# Patient Record
Sex: FEMALE | Race: WHITE | Employment: FULL TIME | URBAN - METROPOLITAN AREA
[De-identification: names, ages, dates, MRNs, and addresses within clinical notes are randomized per-mention and may not be internally consistent; named-entity substitution may affect disease eponyms.]

---

## 2017-01-25 ENCOUNTER — HOSPITAL ENCOUNTER (EMERGENCY)
Age: 37
Discharge: HOME | End: 2017-01-25
Attending: EMERGENCY MEDICINE
Payer: COMMERCIAL

## 2017-01-25 VITALS
BODY MASS INDEX: 24.66 KG/M2 | SYSTOLIC BLOOD PRESSURE: 125 MMHG | OXYGEN SATURATION: 99 % | WEIGHT: 134 LBS | HEART RATE: 98 BPM | HEIGHT: 62 IN | TEMPERATURE: 98 F | DIASTOLIC BLOOD PRESSURE: 75 MMHG | RESPIRATION RATE: 18 BRPM

## 2017-01-25 DIAGNOSIS — R19.7 DIARRHEA, UNSPECIFIED TYPE: Primary | ICD-10-CM

## 2017-01-25 DIAGNOSIS — R11.0 NAUSEA: ICD-10-CM

## 2017-01-25 LAB
ALBUMIN SERPL-MCNC: 4.2 G/DL (ref 3.5–4.8)
ALP LIVER SERPL-CCNC: 84 U/L (ref 37–98)
ALT SERPL-CCNC: 23 U/L (ref 14–54)
AST SERPL-CCNC: 17 U/L (ref 15–41)
BASOPHILS # BLD AUTO: 0.01 X10(3) UL (ref 0–0.1)
BASOPHILS NFR BLD AUTO: 0.1 %
BILIRUB SERPL-MCNC: 0.5 MG/DL (ref 0.1–2)
BUN BLD-MCNC: 11 MG/DL (ref 8–20)
CALCIUM BLD-MCNC: 9.2 MG/DL (ref 8.3–10.3)
CHLORIDE: 107 MMOL/L (ref 101–111)
CO2: 23 MMOL/L (ref 22–32)
CREAT BLD-MCNC: 0.7 MG/DL (ref 0.55–1.02)
EOSINOPHIL # BLD AUTO: 0.03 X10(3) UL (ref 0–0.3)
EOSINOPHIL NFR BLD AUTO: 0.3 %
ERYTHROCYTE [DISTWIDTH] IN BLOOD BY AUTOMATED COUNT: 12.4 % (ref 11.5–16)
GLUCOSE BLD-MCNC: 107 MG/DL (ref 70–99)
HCT VFR BLD AUTO: 49.6 % (ref 34–50)
HGB BLD-MCNC: 16.6 G/DL (ref 12–16)
IMMATURE GRANULOCYTE COUNT: 0.02 X10(3) UL (ref 0–1)
IMMATURE GRANULOCYTE RATIO %: 0.2 %
LYMPHOCYTES # BLD AUTO: 0.45 X10(3) UL (ref 0.9–4)
LYMPHOCYTES NFR BLD AUTO: 5.1 %
M PROTEIN MFR SERPL ELPH: 8 G/DL (ref 6.1–8.3)
MCH RBC QN AUTO: 29.9 PG (ref 27–33.2)
MCHC RBC AUTO-ENTMCNC: 33.5 G/DL (ref 31–37)
MCV RBC AUTO: 89.4 FL (ref 81–100)
MONOCYTES # BLD AUTO: 0.56 X10(3) UL (ref 0.1–0.6)
MONOCYTES NFR BLD AUTO: 6.3 %
NEUTROPHIL ABS PRELIM: 7.84 X10 (3) UL (ref 1.3–6.7)
NEUTROPHILS # BLD AUTO: 7.84 X10(3) UL (ref 1.3–6.7)
NEUTROPHILS NFR BLD AUTO: 88 %
PLATELET # BLD AUTO: 256 10(3)UL (ref 150–450)
POTASSIUM SERPL-SCNC: 3.5 MMOL/L (ref 3.6–5.1)
RBC # BLD AUTO: 5.55 X10(6)UL (ref 3.8–5.1)
RED CELL DISTRIBUTION WIDTH-SD: 41.1 FL (ref 35.1–46.3)
SODIUM SERPL-SCNC: 139 MMOL/L (ref 136–144)
WBC # BLD AUTO: 8.9 X10(3) UL (ref 4–13)

## 2017-01-25 PROCEDURE — 96360 HYDRATION IV INFUSION INIT: CPT

## 2017-01-25 PROCEDURE — 99284 EMERGENCY DEPT VISIT MOD MDM: CPT

## 2017-01-25 PROCEDURE — 80053 COMPREHEN METABOLIC PANEL: CPT | Performed by: EMERGENCY MEDICINE

## 2017-01-25 PROCEDURE — 85025 COMPLETE CBC W/AUTO DIFF WBC: CPT | Performed by: EMERGENCY MEDICINE

## 2017-01-25 RX ORDER — ONDANSETRON 4 MG/1
4 TABLET, ORALLY DISINTEGRATING ORAL EVERY 4 HOURS PRN
Qty: 10 TABLET | Refills: 0 | Status: SHIPPED | OUTPATIENT
Start: 2017-01-25 | End: 2017-02-01

## 2017-01-25 NOTE — ED PROVIDER NOTES
Patient Seen in: THE [facility] Emergency Department In [place]    History   Patient presents with:  Nausea/Vomiting/Diarrhea (gastrointestinal)    Stated Complaint: DIARRHEA AND WEAKNESS SINCE 0300    HPI    39-year-old female presents here to the emergency intact. No scleral icterus or conjunctival pallor: Neck is supple without tenderness on palpation. Head is atraumatic normocephalic. Oral mucosa moist.  Tongue is midline. No posterior pharyngeal lesions. Lungs: Clear to auscultation bilaterally.   Christophe Banner that she likely has a viral form of diarrhea causing symptoms. Continue to push fluids at home. She will written Zofran for nausea.   Return if she develops any worsening symptoms such as intractable vomiting, intractable pain, blood in her stools or alte

## 2017-03-28 ENCOUNTER — OFFICE VISIT (OUTPATIENT)
Dept: FAMILY MEDICINE CLINIC | Facility: CLINIC | Age: 37
End: 2017-03-28

## 2017-03-28 ENCOUNTER — LAB ENCOUNTER (OUTPATIENT)
Dept: LAB | Age: 37
End: 2017-03-28
Attending: INTERNAL MEDICINE
Payer: COMMERCIAL

## 2017-03-28 VITALS
SYSTOLIC BLOOD PRESSURE: 122 MMHG | TEMPERATURE: 98 F | DIASTOLIC BLOOD PRESSURE: 80 MMHG | RESPIRATION RATE: 16 BRPM | WEIGHT: 138 LBS | BODY MASS INDEX: 25.4 KG/M2 | HEART RATE: 60 BPM | HEIGHT: 62 IN

## 2017-03-28 DIAGNOSIS — R53.83 FATIGUE, UNSPECIFIED TYPE: ICD-10-CM

## 2017-03-28 DIAGNOSIS — L65.9 HAIR LOSS: Primary | ICD-10-CM

## 2017-03-28 DIAGNOSIS — L65.9 HAIR LOSS: ICD-10-CM

## 2017-03-28 LAB
BASOPHILS # BLD AUTO: 0.04 X10(3) UL (ref 0–0.1)
BASOPHILS NFR BLD AUTO: 0.6 %
DEPRECATED HBV CORE AB SER IA-ACNC: 18.3 NG/ML (ref 10–291)
EOSINOPHIL # BLD AUTO: 0.07 X10(3) UL (ref 0–0.3)
EOSINOPHIL NFR BLD AUTO: 1 %
ERYTHROCYTE [DISTWIDTH] IN BLOOD BY AUTOMATED COUNT: 12.6 % (ref 11.5–16)
HCT VFR BLD AUTO: 46.4 % (ref 34–50)
HGB BLD-MCNC: 15.1 G/DL (ref 12–16)
IMMATURE GRANULOCYTE COUNT: 0.02 X10(3) UL (ref 0–1)
IMMATURE GRANULOCYTE RATIO %: 0.3 %
IRON SATURATION: 25 % (ref 13–45)
IRON: 104 UG/DL (ref 28–170)
LYMPHOCYTES # BLD AUTO: 2.37 X10(3) UL (ref 0.9–4)
LYMPHOCYTES NFR BLD AUTO: 33.7 %
MCH RBC QN AUTO: 30.6 PG (ref 27–33.2)
MCHC RBC AUTO-ENTMCNC: 32.5 G/DL (ref 31–37)
MCV RBC AUTO: 94.1 FL (ref 81–100)
MONOCYTES # BLD AUTO: 0.45 X10(3) UL (ref 0.1–0.6)
MONOCYTES NFR BLD AUTO: 6.4 %
NEUTROPHIL ABS PRELIM: 4.08 X10 (3) UL (ref 1.3–6.7)
NEUTROPHILS # BLD AUTO: 4.08 X10(3) UL (ref 1.3–6.7)
NEUTROPHILS NFR BLD AUTO: 58 %
PLATELET # BLD AUTO: 273 10(3)UL (ref 150–450)
RBC # BLD AUTO: 4.93 X10(6)UL (ref 3.8–5.1)
RED CELL DISTRIBUTION WIDTH-SD: 43.3 FL (ref 35.1–46.3)
TOTAL IRON BINDING CAPACITY: 419 UG/DL (ref 298–536)
TRANSFERRIN: 281 MG/DL (ref 200–360)
TSI SER-ACNC: 2.85 MIU/ML (ref 0.35–5.5)
WBC # BLD AUTO: 7 X10(3) UL (ref 4–13)

## 2017-03-28 PROCEDURE — 82728 ASSAY OF FERRITIN: CPT

## 2017-03-28 PROCEDURE — 84443 ASSAY THYROID STIM HORMONE: CPT

## 2017-03-28 PROCEDURE — 83540 ASSAY OF IRON: CPT

## 2017-03-28 PROCEDURE — 99203 OFFICE O/P NEW LOW 30 MIN: CPT | Performed by: INTERNAL MEDICINE

## 2017-03-28 PROCEDURE — 85025 COMPLETE CBC W/AUTO DIFF WBC: CPT

## 2017-03-28 PROCEDURE — 36415 COLL VENOUS BLD VENIPUNCTURE: CPT

## 2017-03-28 PROCEDURE — 83550 IRON BINDING TEST: CPT

## 2017-03-28 RX ORDER — AMOXICILLIN 250 MG
1 CAPSULE ORAL DAILY
COMMUNITY
End: 2020-07-06

## 2017-03-28 NOTE — PROGRESS NOTES
[facility] Internal Medicine Office Note  Chief Complaint:   Patient presents with:  Establish Care  Hair/Scalp Problem: hair loss x 1 mth       HPI:   This is a 43year old female new patient coming in for hair loss.  She stopped breastfeeding 2 Estimated body mass index is 25.23 kg/(m^2) as calculated from the following:    Height as of this encounter: 62\". Weight as of this encounter: 138 lb. Vital signs reviewed. Appears stated age, well groomed. Physical Exam   Vitals reviewed.    Salvadori 09/01/2016  Patient/Caregiver Education: Patient/Caregiver Education: There are no barriers to learning. Medical education done. Outcome: Patient verbalizes understanding.  Patient is notified to call with any questions, complications, allergies, or worseni

## 2017-03-28 NOTE — PATIENT INSTRUCTIONS
Thank you for choosing Tracy Christianson MD at [place] [address]  To Do: Julio Marie  1.  Check labs today    Follow up with dermatology if hair loss persists     • Please signup for MY CHART, which is electronic access to your record if you have no Scheduling at [phone]  Please allow our office 5 business days to contact you regarding any testing results.     Refill policies:   Allow 3 business days for refills; controlled substances may take longer and must be picked up from the office in person

## 2017-07-27 ENCOUNTER — OFFICE VISIT (OUTPATIENT)
Dept: FAMILY MEDICINE CLINIC | Facility: CLINIC | Age: 37
End: 2017-07-27

## 2017-07-27 ENCOUNTER — APPOINTMENT (OUTPATIENT)
Dept: LAB | Age: 37
End: 2017-07-27
Attending: INTERNAL MEDICINE
Payer: COMMERCIAL

## 2017-07-27 VITALS
HEART RATE: 70 BPM | HEIGHT: 62 IN | RESPIRATION RATE: 16 BRPM | TEMPERATURE: 98 F | BODY MASS INDEX: 26.13 KG/M2 | WEIGHT: 142 LBS | SYSTOLIC BLOOD PRESSURE: 120 MMHG | DIASTOLIC BLOOD PRESSURE: 80 MMHG

## 2017-07-27 DIAGNOSIS — Z00.00 WELLNESS EXAMINATION: Primary | ICD-10-CM

## 2017-07-27 DIAGNOSIS — M25.60 MORNING JOINT STIFFNESS: ICD-10-CM

## 2017-07-27 DIAGNOSIS — M25.50 MULTIPLE JOINT PAIN: ICD-10-CM

## 2017-07-27 DIAGNOSIS — Z00.00 LABORATORY EXAM ORDERED AS PART OF ROUTINE GENERAL MEDICAL EXAMINATION: ICD-10-CM

## 2017-07-27 LAB
ALBUMIN SERPL-MCNC: 4.1 G/DL (ref 3.5–4.8)
ALP LIVER SERPL-CCNC: 61 U/L (ref 37–98)
ALT SERPL-CCNC: 26 U/L (ref 14–54)
AST SERPL-CCNC: 17 U/L (ref 15–41)
BILIRUB SERPL-MCNC: 0.8 MG/DL (ref 0.1–2)
BUN BLD-MCNC: 14 MG/DL (ref 8–20)
CALCIUM BLD-MCNC: 8.6 MG/DL (ref 8.3–10.3)
CHLORIDE: 107 MMOL/L (ref 101–111)
CHOLEST SMN-MCNC: 187 MG/DL (ref ?–200)
CO2: 25 MMOL/L (ref 22–32)
CREAT BLD-MCNC: 0.7 MG/DL (ref 0.55–1.02)
GLUCOSE BLD-MCNC: 80 MG/DL (ref 70–99)
HDLC SERPL-MCNC: 90 MG/DL (ref 45–?)
HDLC SERPL: 2.08 {RATIO} (ref ?–4.44)
LDLC SERPL CALC-MCNC: 78 MG/DL (ref ?–130)
LDLC SERPL-MCNC: 19 MG/DL (ref 5–40)
M PROTEIN MFR SERPL ELPH: 8 G/DL (ref 6.1–8.3)
NONHDLC SERPL-MCNC: 97 MG/DL (ref ?–130)
POTASSIUM SERPL-SCNC: 4 MMOL/L (ref 3.6–5.1)
RHEUMATOID FACT SERPL-ACNC: <10 IU/ML (ref ?–15)
SODIUM SERPL-SCNC: 141 MMOL/L (ref 136–144)
TRIGLYCERIDES: 93 MG/DL (ref ?–150)

## 2017-07-27 PROCEDURE — 36415 COLL VENOUS BLD VENIPUNCTURE: CPT | Performed by: INTERNAL MEDICINE

## 2017-07-27 PROCEDURE — 99395 PREV VISIT EST AGE 18-39: CPT | Performed by: INTERNAL MEDICINE

## 2017-07-27 PROCEDURE — 86431 RHEUMATOID FACTOR QUANT: CPT | Performed by: INTERNAL MEDICINE

## 2017-07-27 PROCEDURE — 86200 CCP ANTIBODY: CPT | Performed by: INTERNAL MEDICINE

## 2017-07-27 PROCEDURE — 80053 COMPREHEN METABOLIC PANEL: CPT | Performed by: INTERNAL MEDICINE

## 2017-07-27 PROCEDURE — 80061 LIPID PANEL: CPT | Performed by: INTERNAL MEDICINE

## 2017-07-27 NOTE — PATIENT INSTRUCTIONS
Thank you for choosing Kevin Sarah MD at [place] [address]  To Do: Juanpablo Guevara  1. Blood work today     Effective 6/19/17 until November 2017  Due to [employer] is being moved.   It is inside the [place] [id number] on duty and for your safety to discuss with the pharmacist and our office with questions, and to notify us and stop treatment if problems arise, but know that our intention is that the benefits outweigh those potential risks and we strive to make you healthie

## 2017-07-27 NOTE — PROGRESS NOTES
[facility] Internal Medicine Office Note  Chief Complaint:   Patient presents with:  Wellness Visit      HPI:   This is a 43year old female coming in for physical  HPI  Her hair loss has significantly improved.   She followed up with several derm Eyes: Negative for visual disturbance. Respiratory: Negative for shortness of breath. Cardiovascular: Negative for chest pain. Gastrointestinal: Negative for diarrhea and constipation. Genitourinary: Negative for dysuria.    Musculoskeletal: Posi today for wellness visit.     Diagnoses and all orders for this visit:    Wellness examination - up to date with Pap smear  -Tdap within last year  -TSH and CBC were normal a few months ago; will check lipid panel and CMP    Laboratory exam ordered as part

## 2017-07-29 LAB — CYCLIC CITRULLINATED PEPTIDE: 4 UNITS

## 2018-02-03 ENCOUNTER — HOSPITAL ENCOUNTER (EMERGENCY)
Facility: HOSPITAL | Age: 38
Discharge: HOME | End: 2018-02-03
Attending: EMERGENCY MEDICINE
Payer: COMMERCIAL

## 2018-02-03 VITALS
TEMPERATURE: 99 F | DIASTOLIC BLOOD PRESSURE: 69 MMHG | BODY MASS INDEX: 29.81 KG/M2 | OXYGEN SATURATION: 97 % | RESPIRATION RATE: 18 BRPM | WEIGHT: 162 LBS | HEIGHT: 62 IN | SYSTOLIC BLOOD PRESSURE: 114 MMHG | HEART RATE: 110 BPM

## 2018-02-03 DIAGNOSIS — J11.1 INFLUENZA: ICD-10-CM

## 2018-02-03 DIAGNOSIS — Z3A.28 28 WEEKS GESTATION OF PREGNANCY: ICD-10-CM

## 2018-02-03 DIAGNOSIS — J06.9 VIRAL URI: Primary | ICD-10-CM

## 2018-02-03 DIAGNOSIS — J45.21 MILD INTERMITTENT ASTHMA WITH EXACERBATION: ICD-10-CM

## 2018-02-03 LAB
ALBUMIN SERPL-MCNC: 2.6 G/DL (ref 3.5–4.8)
ALP LIVER SERPL-CCNC: 84 U/L (ref 37–98)
ALT SERPL-CCNC: 17 U/L (ref 14–54)
AST SERPL-CCNC: 16 U/L (ref 15–41)
BASOPHILS # BLD AUTO: 0.01 X10(3) UL (ref 0–0.1)
BASOPHILS NFR BLD AUTO: 0.1 %
BILIRUB SERPL-MCNC: 0.2 MG/DL (ref 0.1–2)
BUN BLD-MCNC: 6 MG/DL (ref 8–20)
CALCIUM BLD-MCNC: 8 MG/DL (ref 8.3–10.3)
CHLORIDE: 111 MMOL/L (ref 101–111)
CO2: 21 MMOL/L (ref 22–32)
CREAT BLD-MCNC: 0.57 MG/DL (ref 0.55–1.02)
EOSINOPHIL # BLD AUTO: 0.02 X10(3) UL (ref 0–0.3)
EOSINOPHIL NFR BLD AUTO: 0.2 %
ERYTHROCYTE [DISTWIDTH] IN BLOOD BY AUTOMATED COUNT: 13.5 % (ref 11.5–16)
GLUCOSE BLD-MCNC: 119 MG/DL (ref 70–99)
HCT VFR BLD AUTO: 33.6 % (ref 34–50)
HGB BLD-MCNC: 11.2 G/DL (ref 12–16)
IMMATURE GRANULOCYTE COUNT: 0.07 X10(3) UL (ref 0–1)
IMMATURE GRANULOCYTE RATIO %: 0.7 %
LYMPHOCYTES # BLD AUTO: 0.75 X10(3) UL (ref 0.9–4)
LYMPHOCYTES NFR BLD AUTO: 7 %
M PROTEIN MFR SERPL ELPH: 6.2 G/DL (ref 6.1–8.3)
MCH RBC QN AUTO: 30.4 PG (ref 27–33.2)
MCHC RBC AUTO-ENTMCNC: 33.3 G/DL (ref 31–37)
MCV RBC AUTO: 91.3 FL (ref 81–100)
MONOCYTES # BLD AUTO: 0.75 X10(3) UL (ref 0.1–0.6)
MONOCYTES NFR BLD AUTO: 7 %
NEUTROPHIL ABS PRELIM: 9.15 X10 (3) UL (ref 1.3–6.7)
NEUTROPHILS # BLD AUTO: 9.15 X10(3) UL (ref 1.3–6.7)
NEUTROPHILS NFR BLD AUTO: 85 %
PLATELET # BLD AUTO: 158 10(3)UL (ref 150–450)
POTASSIUM SERPL-SCNC: 3.1 MMOL/L (ref 3.6–5.1)
RBC # BLD AUTO: 3.68 X10(6)UL (ref 3.8–5.1)
RED CELL DISTRIBUTION WIDTH-SD: 45.1 FL (ref 35.1–46.3)
SODIUM SERPL-SCNC: 141 MMOL/L (ref 136–144)
WBC # BLD AUTO: 10.8 X10(3) UL (ref 4–13)

## 2018-02-03 PROCEDURE — 96374 THER/PROPH/DIAG INJ IV PUSH: CPT

## 2018-02-03 PROCEDURE — 96361 HYDRATE IV INFUSION ADD-ON: CPT

## 2018-02-03 PROCEDURE — 99284 EMERGENCY DEPT VISIT MOD MDM: CPT

## 2018-02-03 PROCEDURE — 85025 COMPLETE CBC W/AUTO DIFF WBC: CPT | Performed by: EMERGENCY MEDICINE

## 2018-02-03 PROCEDURE — 87999 UNLISTED MICROBIOLOGY PX: CPT

## 2018-02-03 PROCEDURE — 87798 DETECT AGENT NOS DNA AMP: CPT | Performed by: EMERGENCY MEDICINE

## 2018-02-03 PROCEDURE — 94644 CONT INHLJ TX 1ST HOUR: CPT

## 2018-02-03 PROCEDURE — 87502 INFLUENZA DNA AMP PROBE: CPT | Performed by: EMERGENCY MEDICINE

## 2018-02-03 PROCEDURE — 80053 COMPREHEN METABOLIC PANEL: CPT | Performed by: EMERGENCY MEDICINE

## 2018-02-03 RX ORDER — OSELTAMIVIR PHOSPHATE 75 MG/1
75 CAPSULE ORAL 2 TIMES DAILY
Qty: 10 CAPSULE | Refills: 0 | Status: SHIPPED | OUTPATIENT
Start: 2018-02-03 | End: 2018-08-14 | Stop reason: ALTCHOICE

## 2018-02-03 RX ORDER — METHYLPREDNISOLONE SODIUM SUCCINATE 125 MG/2ML
125 INJECTION, POWDER, LYOPHILIZED, FOR SOLUTION INTRAMUSCULAR; INTRAVENOUS ONCE
Status: COMPLETED | OUTPATIENT
Start: 2018-02-03 | End: 2018-02-03

## 2018-02-03 RX ORDER — POTASSIUM CHLORIDE 20 MEQ/1
40 TABLET, EXTENDED RELEASE ORAL ONCE
Status: COMPLETED | OUTPATIENT
Start: 2018-02-03 | End: 2018-02-03

## 2018-02-03 NOTE — ED NOTES
DC instructions and RX handed to pt. No distress noted. Spouse at bedside. Pt thanks staff for care.  Denies need for [facility] out of ED

## 2018-02-03 NOTE — PROGRESS NOTES
Patient is a 42year old  re[place] prenatal [place] Aud[place] [facility] [place] Patient reported EDC of 18, 30w6d today. Reports + fetal movement, denies any leaking of fluid, bleeding, or cramping. Abdomen soft and non-tender.

## 2018-02-03 NOTE — ED INITIAL ASSESSMENT (HPI)
Pt to ED with c/o asthma exacerbation. Pt is 7 mos pregnant. Denies any issues with pregnancy. Pt taking nebs at home, not helping.

## 2018-02-03 NOTE — PROGRESS NOTES
Dr. Seay Led in department. MD visualized fetal heart tracing, agrees it is appropriate for gestational age.

## 2018-02-03 NOTE — ED NOTES
Round on pt. Spouse at bedside. No distress noted.  Pt denies any needs at this time  Pt sts she feels the same, no improvement to breathing

## 2018-02-03 NOTE — ED PROVIDER NOTES
Patient Seen in: [facility] Emergency Department    History   Patient presents with:  Dyspnea GUSTABO SOB (respiratory)    Stated Complaint: sob    HPI    Patient is a very pleasant 38-year-old female with a history of asthma who presents with an asthma Nontender. Extremities: No clubbing/cyanosis/edema. Skin: No rashes, no pallor  Neuro: Awake oriented ×3.   Nonfocal.  Good strength throughout    ED Course     Labs Reviewed   COMP METABOLIC PANEL (14) - Abnormal; Notable for the following:        Result monitored by labor and delivery and was cleared. Patient's feeling better. She was hydrated. Heart rate improved after off the neb. Was under 100 on monitor prior to discharge. Influenza test came back positive for influenza A. Will start Tamiflu.   C

## 2018-02-03 NOTE — ED NOTES
RT at bedside. Per MD, he spoke to [facility] MD. Our OB team will come down to ER to monitor pt.

## 2018-08-14 ENCOUNTER — OFFICE VISIT (OUTPATIENT)
Dept: INTERNAL MEDICINE CLINIC | Facility: CLINIC | Age: 38
End: 2018-08-14
Payer: COMMERCIAL

## 2018-08-14 ENCOUNTER — LAB ENCOUNTER (OUTPATIENT)
Dept: LAB | Age: 38
End: 2018-08-14
Attending: INTERNAL MEDICINE
Payer: COMMERCIAL

## 2018-08-14 VITALS
WEIGHT: 147.5 LBS | DIASTOLIC BLOOD PRESSURE: 74 MMHG | HEART RATE: 60 BPM | TEMPERATURE: 98 F | HEIGHT: 61.5 IN | BODY MASS INDEX: 27.49 KG/M2 | RESPIRATION RATE: 16 BRPM | SYSTOLIC BLOOD PRESSURE: 116 MMHG

## 2018-08-14 DIAGNOSIS — Z12.39 SCREENING FOR BREAST CANCER: ICD-10-CM

## 2018-08-14 DIAGNOSIS — Z00.00 LABORATORY EXAM ORDERED AS PART OF ROUTINE GENERAL MEDICAL EXAMINATION: ICD-10-CM

## 2018-08-14 DIAGNOSIS — F41.9 ANXIETY: ICD-10-CM

## 2018-08-14 DIAGNOSIS — L65.9 HAIR LOSS: ICD-10-CM

## 2018-08-14 DIAGNOSIS — Z00.00 WELLNESS EXAMINATION: Primary | ICD-10-CM

## 2018-08-14 LAB
ALBUMIN SERPL-MCNC: 4 G/DL (ref 3.5–4.8)
ALBUMIN/GLOB SERPL: 1.1 {RATIO} (ref 1–2)
ALP LIVER SERPL-CCNC: 69 U/L (ref 37–98)
ALT SERPL-CCNC: 28 U/L (ref 14–54)
ANION GAP SERPL CALC-SCNC: 8 MMOL/L (ref 0–18)
AST SERPL-CCNC: 20 U/L (ref 15–41)
BASOPHILS # BLD AUTO: 0.05 X10(3) UL (ref 0–0.1)
BASOPHILS NFR BLD AUTO: 0.8 %
BILIRUB SERPL-MCNC: 0.4 MG/DL (ref 0.1–2)
BUN BLD-MCNC: 15 MG/DL (ref 8–20)
BUN/CREAT SERPL: 20 (ref 10–20)
CALCIUM BLD-MCNC: 8.8 MG/DL (ref 8.3–10.3)
CHLORIDE SERPL-SCNC: 108 MMOL/L (ref 101–111)
CHOLEST SMN-MCNC: 179 MG/DL (ref ?–200)
CO2 SERPL-SCNC: 26 MMOL/L (ref 22–32)
CREAT BLD-MCNC: 0.75 MG/DL (ref 0.55–1.02)
EOSINOPHIL # BLD AUTO: 0.11 X10(3) UL (ref 0–0.3)
EOSINOPHIL NFR BLD AUTO: 1.8 %
ERYTHROCYTE [DISTWIDTH] IN BLOOD BY AUTOMATED COUNT: 13.3 % (ref 11.5–16)
GLOBULIN PLAS-MCNC: 3.5 G/DL (ref 2.5–3.7)
GLUCOSE BLD-MCNC: 89 MG/DL (ref 70–99)
HCT VFR BLD AUTO: 47 % (ref 34–50)
HDLC SERPL-MCNC: 81 MG/DL (ref 40–59)
HGB BLD-MCNC: 15.3 G/DL (ref 12–16)
IMMATURE GRANULOCYTE COUNT: 0 X10(3) UL (ref 0–1)
IMMATURE GRANULOCYTE RATIO %: 0 %
LDLC SERPL CALC-MCNC: 86 MG/DL (ref ?–100)
LYMPHOCYTES # BLD AUTO: 2.05 X10(3) UL (ref 0.9–4)
LYMPHOCYTES NFR BLD AUTO: 33.1 %
M PROTEIN MFR SERPL ELPH: 7.5 G/DL (ref 6.1–8.3)
MCH RBC QN AUTO: 30.3 PG (ref 27–33.2)
MCHC RBC AUTO-ENTMCNC: 32.6 G/DL (ref 31–37)
MCV RBC AUTO: 93.1 FL (ref 81–100)
MONOCYTES # BLD AUTO: 0.49 X10(3) UL (ref 0.1–1)
MONOCYTES NFR BLD AUTO: 7.9 %
NEUTROPHIL ABS PRELIM: 3.5 X10 (3) UL (ref 1.3–6.7)
NEUTROPHILS # BLD AUTO: 3.5 X10(3) UL (ref 1.3–6.7)
NEUTROPHILS NFR BLD AUTO: 56.4 %
NONHDLC SERPL-MCNC: 98 MG/DL (ref ?–130)
OSMOLALITY SERPL CALC.SUM OF ELEC: 294 MOSM/KG (ref 275–295)
PLATELET # BLD AUTO: 257 10(3)UL (ref 150–450)
POTASSIUM SERPL-SCNC: 4.2 MMOL/L (ref 3.6–5.1)
RBC # BLD AUTO: 5.05 X10(6)UL (ref 3.8–5.1)
RED CELL DISTRIBUTION WIDTH-SD: 45.5 FL (ref 35.1–46.3)
SODIUM SERPL-SCNC: 142 MMOL/L (ref 136–144)
TRIGL SERPL-MCNC: 60 MG/DL (ref 30–149)
TSI SER-ACNC: 2.18 MIU/ML (ref 0.35–5.5)
VIT D+METAB SERPL-MCNC: 31.1 NG/ML (ref 30–100)
VLDLC SERPL CALC-MCNC: 12 MG/DL (ref 0–30)
WBC # BLD AUTO: 6.2 X10(3) UL (ref 4–13)

## 2018-08-14 PROCEDURE — 36415 COLL VENOUS BLD VENIPUNCTURE: CPT | Performed by: INTERNAL MEDICINE

## 2018-08-14 PROCEDURE — 80061 LIPID PANEL: CPT | Performed by: INTERNAL MEDICINE

## 2018-08-14 PROCEDURE — 99396 PREV VISIT EST AGE 40-64: CPT | Performed by: INTERNAL MEDICINE

## 2018-08-14 PROCEDURE — 80050 GENERAL HEALTH PANEL: CPT | Performed by: INTERNAL MEDICINE

## 2018-08-14 PROCEDURE — 82306 VITAMIN D 25 HYDROXY: CPT | Performed by: INTERNAL MEDICINE

## 2018-08-14 RX ORDER — BUDESONIDE 0.25 MG/2ML
INHALANT ORAL
COMMUNITY
End: 2020-07-06

## 2018-08-14 RX ORDER — ALPRAZOLAM 0.25 MG/1
0.25 TABLET ORAL 2 TIMES DAILY PRN
Qty: 30 TABLET | Refills: 2 | Status: SHIPPED | OUTPATIENT
Start: 2018-08-14 | End: 2020-07-06

## 2018-08-14 NOTE — PROGRESS NOTES
[facility] Internal Medicine Office Note  Chief Complaint:   Patient presents with:   Well Adult: physical, is fasting for blood work, would like to discuss anxiety high in the last few weeks   Hair/Scalp Problem: loss for last few weeks       HPI Review of Systems   Constitutional: Negative for fever. HENT: Negative for rhinorrhea. Eyes: Negative for visual disturbance. Respiratory: Negative for shortness of breath. Cardiovascular: Negative for chest pain.    Gastrointestinal: Negative f for mammo - complete in a month or two when further out from cessation of breastfeeding     Laboratory exam ordered as part of routine general medical examination  -     CBC WITH DIFFERENTIAL WITH PLATELET; Future  -     COMP METABOLIC PANEL (14);  Future

## 2018-12-27 ENCOUNTER — HOSPITAL ENCOUNTER (OUTPATIENT)
Dept: MAMMOGRAPHY | Age: 38
Discharge: HOME | End: 2018-12-27
Attending: INTERNAL MEDICINE
Payer: COMMERCIAL

## 2018-12-27 DIAGNOSIS — Z12.39 SCREENING FOR BREAST CANCER: ICD-10-CM

## 2018-12-27 PROCEDURE — 77067 SCR MAMMO BI INCL CAD: CPT | Performed by: INTERNAL MEDICINE

## 2020-02-10 ENCOUNTER — HOSPITAL ENCOUNTER (EMERGENCY)
Facility: HOSPITAL | Age: 40
Discharge: HOME | End: 2020-02-11
Attending: EMERGENCY MEDICINE
Payer: COMMERCIAL

## 2020-02-10 DIAGNOSIS — G44.209 TENSION HEADACHE: Primary | ICD-10-CM

## 2020-02-10 PROCEDURE — 96360 HYDRATION IV INFUSION INIT: CPT

## 2020-02-10 PROCEDURE — 99284 EMERGENCY DEPT VISIT MOD MDM: CPT

## 2020-02-11 ENCOUNTER — APPOINTMENT (OUTPATIENT)
Dept: CT IMAGING | Facility: HOSPITAL | Age: 40
End: 2020-02-11
Attending: EMERGENCY MEDICINE
Payer: COMMERCIAL

## 2020-02-11 VITALS
BODY MASS INDEX: 29.44 KG/M2 | WEIGHT: 160 LBS | OXYGEN SATURATION: 98 % | TEMPERATURE: 98 F | RESPIRATION RATE: 18 BRPM | HEART RATE: 85 BPM | HEIGHT: 62 IN | SYSTOLIC BLOOD PRESSURE: 139 MMHG | DIASTOLIC BLOOD PRESSURE: 91 MMHG

## 2020-02-11 LAB
ALBUMIN SERPL-MCNC: 4.3 G/DL (ref 3.4–5)
ALBUMIN/GLOB SERPL: 1.1 {RATIO} (ref 1–2)
ALP LIVER SERPL-CCNC: 88 U/L (ref 37–98)
ALT SERPL-CCNC: 32 U/L (ref 13–56)
ANION GAP SERPL CALC-SCNC: 5 MMOL/L (ref 0–18)
AST SERPL-CCNC: 21 U/L (ref 15–37)
BASOPHILS # BLD AUTO: 0.04 X10(3) UL (ref 0–0.2)
BASOPHILS NFR BLD AUTO: 0.4 %
BILIRUB SERPL-MCNC: 0.3 MG/DL (ref 0.1–2)
BUN BLD-MCNC: 17 MG/DL (ref 7–18)
BUN/CREAT SERPL: 23 (ref 10–20)
CALCIUM BLD-MCNC: 9.4 MG/DL (ref 8.5–10.1)
CHLORIDE SERPL-SCNC: 105 MMOL/L (ref 98–112)
CO2 SERPL-SCNC: 29 MMOL/L (ref 21–32)
CREAT BLD-MCNC: 0.74 MG/DL (ref 0.55–1.02)
DEPRECATED RDW RBC AUTO: 42 FL (ref 35.1–46.3)
EOSINOPHIL # BLD AUTO: 0.15 X10(3) UL (ref 0–0.7)
EOSINOPHIL NFR BLD AUTO: 1.5 %
ERYTHROCYTE [DISTWIDTH] IN BLOOD BY AUTOMATED COUNT: 12.6 % (ref 11–15)
GLOBULIN PLAS-MCNC: 4 G/DL (ref 2.8–4.4)
GLUCOSE BLD-MCNC: 99 MG/DL (ref 70–99)
HCT VFR BLD AUTO: 49.3 % (ref 35–48)
HGB BLD-MCNC: 16.3 G/DL (ref 12–16)
IMM GRANULOCYTES # BLD AUTO: 0.03 X10(3) UL (ref 0–1)
IMM GRANULOCYTES NFR BLD: 0.3 %
LYMPHOCYTES # BLD AUTO: 3.34 X10(3) UL (ref 1–4)
LYMPHOCYTES NFR BLD AUTO: 34.2 %
M PROTEIN MFR SERPL ELPH: 8.3 G/DL (ref 6.4–8.2)
MCH RBC QN AUTO: 30 PG (ref 26–34)
MCHC RBC AUTO-ENTMCNC: 33.1 G/DL (ref 31–37)
MCV RBC AUTO: 90.8 FL (ref 80–100)
MONOCYTES # BLD AUTO: 0.8 X10(3) UL (ref 0.1–1)
MONOCYTES NFR BLD AUTO: 8.2 %
NEUTROPHILS # BLD AUTO: 5.42 X10 (3) UL (ref 1.5–7.7)
NEUTROPHILS # BLD AUTO: 5.42 X10(3) UL (ref 1.5–7.7)
NEUTROPHILS NFR BLD AUTO: 55.4 %
OSMOLALITY SERPL CALC.SUM OF ELEC: 290 MOSM/KG (ref 275–295)
PLATELET # BLD AUTO: 265 10(3)UL (ref 150–450)
POTASSIUM SERPL-SCNC: 3.6 MMOL/L (ref 3.5–5.1)
RBC # BLD AUTO: 5.43 X10(6)UL (ref 3.8–5.3)
SODIUM SERPL-SCNC: 139 MMOL/L (ref 136–145)
WBC # BLD AUTO: 9.8 X10(3) UL (ref 4–11)

## 2020-02-11 PROCEDURE — 80053 COMPREHEN METABOLIC PANEL: CPT | Performed by: EMERGENCY MEDICINE

## 2020-02-11 PROCEDURE — 70450 CT HEAD/BRAIN W/O DYE: CPT | Performed by: EMERGENCY MEDICINE

## 2020-02-11 PROCEDURE — 85025 COMPLETE CBC W/AUTO DIFF WBC: CPT | Performed by: EMERGENCY MEDICINE

## 2020-02-11 RX ORDER — SODIUM CHLORIDE 9 MG/ML
INJECTION, SOLUTION INTRAVENOUS ONCE
Status: COMPLETED | OUTPATIENT
Start: 2020-02-11 | End: 2020-02-11

## 2020-02-11 RX ORDER — CYCLOBENZAPRINE HCL 10 MG
10 TABLET ORAL 3 TIMES DAILY PRN
Qty: 20 TABLET | Refills: 0 | Status: SHIPPED | OUTPATIENT
Start: 2020-02-11 | End: 2020-02-21

## 2020-02-11 NOTE — ED NOTES
Pt requesting to be seen by MD before having blood drawn or having an IV started. States she may just need a muscle relaxant.

## 2020-02-11 NOTE — ED INITIAL ASSESSMENT (HPI)
Pt reports that she has had numbness and tingling in the back of her head over the past couple of months, states that this evening she felt \"weight\" on top of her head.  Reports that she had this same feeling a few years ago when she had her child, and st

## 2020-02-11 NOTE — ED PROVIDER NOTES
Patient Seen in: [facility] Emergency Department      History   Patient presents with:  Dizziness  Fatigue    Stated Complaint: dizziness, \"weight on my body\"    HPI    Patient is a 40-year-old female comes in emergency room for evaluation of head sclerae white, conjunctiva is pink. Oropharynx is unremarkable, no exudate. NECK: Supple, trachea midline, no lymphadenopathy. LUNG: Lungs clear to auscultation bilaterally, no wheezing, no rales, no rhonchi. CARDIOVASCULAR: Regular rate and rhythm. breast-feeding. I advised her that this is contraindicated with breast-feeding she prefers to have a muscle relaxer and she will pump and dump. 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  Patient was given neurology follow-up.   I've considered other serious e

## 2020-12-29 ENCOUNTER — OFFICE VISIT (OUTPATIENT)
Dept: INTERNAL MEDICINE CLINIC | Facility: CLINIC | Age: 40
End: 2020-12-29
Payer: COMMERCIAL

## 2020-12-29 VITALS
RESPIRATION RATE: 12 BRPM | HEIGHT: 61.5 IN | WEIGHT: 160 LBS | DIASTOLIC BLOOD PRESSURE: 88 MMHG | TEMPERATURE: 98 F | HEART RATE: 79 BPM | BODY MASS INDEX: 29.82 KG/M2 | OXYGEN SATURATION: 100 % | SYSTOLIC BLOOD PRESSURE: 131 MMHG

## 2020-12-29 DIAGNOSIS — R10.9 ABDOMINAL CRAMPING: ICD-10-CM

## 2020-12-29 DIAGNOSIS — R79.89 ELEVATED TSH: Primary | ICD-10-CM

## 2020-12-29 PROCEDURE — 3079F DIAST BP 80-89 MM HG: CPT | Performed by: INTERNAL MEDICINE

## 2020-12-29 PROCEDURE — 99213 OFFICE O/P EST LOW 20 MIN: CPT | Performed by: INTERNAL MEDICINE

## 2020-12-29 PROCEDURE — 3008F BODY MASS INDEX DOCD: CPT | Performed by: INTERNAL MEDICINE

## 2020-12-29 PROCEDURE — 3075F SYST BP GE 130 - 139MM HG: CPT | Performed by: INTERNAL MEDICINE

## 2020-12-29 RX ORDER — KETOCONAZOLE 20 MG/ML
SHAMPOO TOPICAL
COMMUNITY
Start: 2020-12-01

## 2020-12-29 RX ORDER — LEVOTHYROXINE SODIUM 0.03 MG/1
25 TABLET ORAL DAILY
COMMUNITY
Start: 2020-12-17 | End: 2021-05-06

## 2020-12-29 RX ORDER — ALBUTEROL SULFATE 90 UG/1
AEROSOL, METERED RESPIRATORY (INHALATION)
COMMUNITY
Start: 2020-12-01

## 2020-12-29 NOTE — PROGRESS NOTES
[facility] Internal Medicine Office Note  Chief Complaint:   Patient presents with:  Pelvic Pain: pelvic cramping and thyroid level questions      HPI:   This is a 41year old female coming in for abdominal cramping and thyroid questions.    HPI SPORTS AND QID PRN     • Ketoconazole 2 % External Shampoo SUMI AND LEAVE ON SCALP FOR 1 TO 2 MIN B RINSING IN THE SHOWER     • Prenatal MV-Min-Fe Fum-FA-DHA (PRENATAL 1 OR) Take 1 tablet by mouth daily.        • Levothyroxine Sodium 25 MCG Oral Tab Take 25 cramping      The plan is as follows  Malissa Haskins was seen today for pelvic pain. Diagnoses and all orders for this visit:    Elevated TSH -stop hair nail vitamin because biotin can interfere with lab assay. Stop 7 days prior to blood draw.  Will recheck TSH a

## 2020-12-29 NOTE — PATIENT INSTRUCTIONS
Stop any biotin containing vitamins including prenatal and hair skin nail vitamin for 7 days prior to blood work    Blood work due in [address]    Follow up with gynecology     Consider food diary to see if any correlation with cramping

## 2021-01-13 ENCOUNTER — LAB ENCOUNTER (OUTPATIENT)
Dept: LAB | Age: 41
End: 2021-01-13
Attending: INTERNAL MEDICINE
Payer: COMMERCIAL

## 2021-01-13 DIAGNOSIS — R79.89 ELEVATED TSH: ICD-10-CM

## 2021-01-13 LAB
T4 FREE SERPL-MCNC: 0.9 NG/DL (ref 0.8–1.7)
THYROGLOB SERPL-MCNC: 245 U/ML (ref ?–60)
THYROPEROXIDASE AB SERPL-ACNC: 1075 U/ML (ref ?–60)
TSI SER-ACNC: 1.93 MIU/ML (ref 0.36–3.74)

## 2021-01-13 PROCEDURE — 84443 ASSAY THYROID STIM HORMONE: CPT

## 2021-01-13 PROCEDURE — 84439 ASSAY OF FREE THYROXINE: CPT

## 2021-01-13 PROCEDURE — 36415 COLL VENOUS BLD VENIPUNCTURE: CPT

## 2021-01-13 PROCEDURE — 86376 MICROSOMAL ANTIBODY EACH: CPT

## 2021-01-13 PROCEDURE — 86800 THYROGLOBULIN ANTIBODY: CPT

## 2021-05-06 NOTE — PROGRESS NOTES
[facility] Internal Medicine Office Note  Chief Complaint:   Patient presents with:   Anxiety: Ast couple months issues with anxiety      HPI:   This is a 41year old female coming in for anxiety  HPI    She has stress at work with being very busy Negative for shortness of breath. Cardiovascular: Negative for chest pain. Gastrointestinal: Negative for constipation. Genitourinary: Negative for dysuria. Neurological: Negative. Hematological: Negative. Psychiatric/Behavioral: Negative. to. She will give me an update via [employer] on how she is doing. Other orders  -     escitalopram 10 MG Oral Tab; Take 1 tablet (10 mg total) by mouth daily.  -     ALPRAZolam 0.25 MG Oral Tab;  Take 0.5-1 tablets (0.125-0.25 mg total) by mouth 2 (two) t

## 2021-05-06 NOTE — PATIENT INSTRUCTIONS
Alprazolam 1/2 tab as needed - take 1st dose on your day off to see how you do with it    Consider escitalopram and take 1/2 tab once daily for the first 4-5 days to help mitigate gastrointestinal side effects

## 2021-09-21 ENCOUNTER — PATIENT MESSAGE (OUTPATIENT)
Dept: INTERNAL MEDICINE CLINIC | Facility: CLINIC | Age: 41
End: 2021-09-21

## 2021-09-21 NOTE — TELEPHONE ENCOUNTER
From: Esvin Reese  To: Bonnie Gaston MD  Sent: 9/21/2021 9:45 AM CDT  Subject: Refill request    Hi. I was hoping to get a refill on my alprazolam. I am still using it sparingly for work-related anxiety.  I haven’t started the lexapro because I fin

## 2021-09-22 RX ORDER — ALPRAZOLAM 0.25 MG/1
TABLET ORAL 2 TIMES DAILY PRN
Qty: 20 TABLET | Refills: 0 | Status: SHIPPED | OUTPATIENT
Start: 2021-09-22 | End: 2021-11-11

## 2021-09-24 ENCOUNTER — OFFICE VISIT (OUTPATIENT)
Dept: OBGYN CLINIC | Facility: CLINIC | Age: 41
End: 2021-09-24
Payer: COMMERCIAL

## 2021-09-24 VITALS
SYSTOLIC BLOOD PRESSURE: 114 MMHG | DIASTOLIC BLOOD PRESSURE: 66 MMHG | HEART RATE: 81 BPM | BODY MASS INDEX: 26.99 KG/M2 | HEIGHT: 61.5 IN | WEIGHT: 144.81 LBS

## 2021-09-24 DIAGNOSIS — Z12.39 ENCOUNTER FOR SCREENING FOR MALIGNANT NEOPLASM OF BREAST, UNSPECIFIED SCREENING MODALITY: ICD-10-CM

## 2021-09-24 DIAGNOSIS — R10.2 PELVIC PAIN: ICD-10-CM

## 2021-09-24 DIAGNOSIS — Z12.4 SCREENING FOR MALIGNANT NEOPLASM OF CERVIX: ICD-10-CM

## 2021-09-24 DIAGNOSIS — Z01.419 WELL WOMAN EXAM WITH ROUTINE GYNECOLOGICAL EXAM: Primary | ICD-10-CM

## 2021-09-24 DIAGNOSIS — Z12.39 ENCOUNTER FOR OTHER SCREENING FOR MALIGNANT NEOPLASM OF BREAST: ICD-10-CM

## 2021-09-24 PROCEDURE — 99386 PREV VISIT NEW AGE 40-64: CPT | Performed by: OBSTETRICS & GYNECOLOGY

## 2021-09-24 PROCEDURE — 3008F BODY MASS INDEX DOCD: CPT | Performed by: OBSTETRICS & GYNECOLOGY

## 2021-09-24 PROCEDURE — 3074F SYST BP LT 130 MM HG: CPT | Performed by: OBSTETRICS & GYNECOLOGY

## 2021-09-24 PROCEDURE — 87624 HPV HI-RISK TYP POOLED RSLT: CPT | Performed by: OBSTETRICS & GYNECOLOGY

## 2021-09-24 PROCEDURE — 88175 CYTOPATH C/V AUTO FLUID REDO: CPT | Performed by: OBSTETRICS & GYNECOLOGY

## 2021-09-24 PROCEDURE — 3078F DIAST BP <80 MM HG: CPT | Performed by: OBSTETRICS & GYNECOLOGY

## 2021-09-24 RX ORDER — CLOBETASOL PROPIONATE 0.46 MG/ML
SOLUTION TOPICAL
COMMUNITY
Start: 2021-07-01 | End: 2021-12-08

## 2021-09-24 RX ORDER — SPIRONOLACTONE 50 MG/1
50 TABLET, FILM COATED ORAL 2 TIMES DAILY
COMMUNITY
Start: 2021-09-13

## 2021-09-24 NOTE — PROGRESS NOTES
GYN H&P     9/24/2021  11:18 AM    CC: Patient is here for annual exam    HPI: patient is a 35year old [id number] here for her annual exam  She reports she is doing well today.   H/o C/Sx3, most recent 2019 for severe preeclampsia at 32 weeks  Since she has h UNKNOWN  Chlorhexidine           RASH  spironolactone 50 MG Oral Tab, Take 50 mg by mouth 2 (two) times daily. , Disp: , Rfl:   IRON OR, , Disp: , Rfl:   Ergocalciferol (VITAMIN D OR), Take by mouth., Disp: , Rfl:   Prenatal Multivit-Min-Fe-FA (PRENATAL OR) Palpations: Abdomen is soft. There is no mass. Tenderness: There is no abdominal tenderness. There is no guarding or rebound. Hernia: No hernia is present. Genitourinary:     Labia:         Right: No rash, tenderness, lesion or injury. such as scheduled NSAIDs, hormonal contraception, hysterectomy. I would not recommend ablation due to her pain component. We also reviewed option of diagnostic laparoscopy.  For now, she will monitor and let us know if menses change or pain worsens    [address]

## 2021-09-26 ENCOUNTER — HOSPITAL ENCOUNTER (OUTPATIENT)
Age: 41
Discharge: HOME | End: 2021-09-26
Payer: COMMERCIAL

## 2021-09-26 VITALS
BODY MASS INDEX: 26.13 KG/M2 | SYSTOLIC BLOOD PRESSURE: 133 MMHG | TEMPERATURE: 99 F | HEART RATE: 66 BPM | RESPIRATION RATE: 18 BRPM | HEIGHT: 62 IN | DIASTOLIC BLOOD PRESSURE: 94 MMHG | OXYGEN SATURATION: 99 % | WEIGHT: 142 LBS

## 2021-09-26 DIAGNOSIS — T16.1XXA FOREIGN BODY OF RIGHT EAR, INITIAL ENCOUNTER: Primary | ICD-10-CM

## 2021-09-26 PROCEDURE — 99213 OFFICE O/P EST LOW 20 MIN: CPT

## 2021-09-26 NOTE — ED PROVIDER NOTES
Patient Seen in: [facility]      History   Patient presents with:  Ear Problem Pain: Bug in ear - Entered by patient    Stated Complaint: BUG INSIDE RIGHT EAR    Subjective:   HPI    Patient presents to the urgent care with report of ml noted above.     Physical Exam     ED Triage Vitals [09/26/21 0956]   BP (!) 133/94   Pulse 66   Resp 18   Temp 99.1 °F (37.3 °C)   Temp src Tympanic   SpO2 99 %   O2 Device None (Room air)       Current:BP (!) 133/94   Pulse 66   Temp 99.1 °F (37.3 °C) (Ty follow-up instructions were provided to help prevent relapse or worsening. I discussed the case with the patient and they had no questions, complaints, or concerns.   Patient states they understand diagnosis, followup plan and agree with and understand  d

## 2021-09-27 LAB — HPV I/H RISK 1 DNA SPEC QL NAA+PROBE: NEGATIVE

## 2021-10-04 ENCOUNTER — HOSPITAL ENCOUNTER (OUTPATIENT)
Age: 41
Discharge: HOME | End: 2021-10-04
Attending: EMERGENCY MEDICINE
Payer: COMMERCIAL

## 2021-10-04 VITALS
OXYGEN SATURATION: 100 % | RESPIRATION RATE: 16 BRPM | TEMPERATURE: 98 F | HEART RATE: 84 BPM | SYSTOLIC BLOOD PRESSURE: 123 MMHG | DIASTOLIC BLOOD PRESSURE: 89 MMHG

## 2021-10-04 DIAGNOSIS — L03.90 CELLULITIS, UNSPECIFIED CELLULITIS SITE: Primary | ICD-10-CM

## 2021-10-04 PROCEDURE — 99213 OFFICE O/P EST LOW 20 MIN: CPT

## 2021-10-04 RX ORDER — SULFAMETHOXAZOLE AND TRIMETHOPRIM 800; 160 MG/1; MG/1
1 TABLET ORAL 2 TIMES DAILY
Qty: 20 TABLET | Refills: 0 | Status: SHIPPED | OUTPATIENT
Start: 2021-10-04 | End: 2021-10-14

## 2021-10-04 RX ORDER — CEPHALEXIN 500 MG/1
500 CAPSULE ORAL 4 TIMES DAILY
Qty: 40 CAPSULE | Refills: 0 | Status: SHIPPED | OUTPATIENT
Start: 2021-10-04 | End: 2021-10-14

## 2021-10-04 NOTE — ED PROVIDER NOTES
Patient Seen in: [facility]      History   Patient presents with:  Bite    Stated Complaint: Wound Care - Spider bite exam    Subjective:   HPI    Patient is a 52-year-old female presents to immediate care for evaluation of potential inse that this is likely a bite from some type of insect. It does not appear to be like MRSA but will place her on Bactrim and Keflex. Patient was advised to take probiotics to prevent diarrheal illness. Return if worsening redness.   Antibiotics sent to the

## 2021-10-04 NOTE — ED INITIAL ASSESSMENT (HPI)
Patient reports she got some type of insect bite on her stomach, on Friday, and it seems as if it is getting infected.   Patient reports a similar experience in the past.

## 2021-11-06 ENCOUNTER — HOSPITAL ENCOUNTER (OUTPATIENT)
Dept: MAMMOGRAPHY | Age: 41
Discharge: HOME | End: 2021-11-06
Attending: OBSTETRICS & GYNECOLOGY
Payer: COMMERCIAL

## 2021-11-06 DIAGNOSIS — Z12.39 ENCOUNTER FOR SCREENING FOR MALIGNANT NEOPLASM OF BREAST, UNSPECIFIED SCREENING MODALITY: ICD-10-CM

## 2021-11-06 PROCEDURE — 77067 SCR MAMMO BI INCL CAD: CPT | Performed by: OBSTETRICS & GYNECOLOGY

## 2021-11-06 PROCEDURE — 77063 BREAST TOMOSYNTHESIS BI: CPT | Performed by: OBSTETRICS & GYNECOLOGY

## 2021-11-12 RX ORDER — ALPRAZOLAM 0.25 MG/1
TABLET ORAL 2 TIMES DAILY PRN
Qty: 20 TABLET | Refills: 0 | Status: SHIPPED | OUTPATIENT
Start: 2021-11-12 | End: 2021-12-08

## 2021-11-12 NOTE — TELEPHONE ENCOUNTER
No Protocol     Requesting: alprazolam 0.25mg     LOV: 5/6/21   Anxiety - situational anxiety at work, worsened by the end of her shift. Start alprazolam 1/2 tab as needed and discussed potential for drowsiness as side effect.  Take for first time on her da

## 2021-11-30 ENCOUNTER — HOSPITAL ENCOUNTER (EMERGENCY)
Age: 41
Discharge: HOME | End: 2021-11-30
Attending: EMERGENCY MEDICINE
Payer: COMMERCIAL

## 2021-11-30 VITALS
RESPIRATION RATE: 18 BRPM | BODY MASS INDEX: 25.76 KG/M2 | WEIGHT: 140 LBS | DIASTOLIC BLOOD PRESSURE: 92 MMHG | TEMPERATURE: 98 F | OXYGEN SATURATION: 99 % | HEART RATE: 81 BPM | SYSTOLIC BLOOD PRESSURE: 129 MMHG | HEIGHT: 62 IN

## 2021-11-30 DIAGNOSIS — R19.7 DIARRHEA, UNSPECIFIED TYPE: Primary | ICD-10-CM

## 2021-11-30 DIAGNOSIS — R11.0 NAUSEA: ICD-10-CM

## 2021-11-30 PROCEDURE — 81025 URINE PREGNANCY TEST: CPT

## 2021-11-30 PROCEDURE — 99284 EMERGENCY DEPT VISIT MOD MDM: CPT | Performed by: EMERGENCY MEDICINE

## 2021-11-30 PROCEDURE — 81015 MICROSCOPIC EXAM OF URINE: CPT | Performed by: EMERGENCY MEDICINE

## 2021-11-30 PROCEDURE — 80053 COMPREHEN METABOLIC PANEL: CPT | Performed by: EMERGENCY MEDICINE

## 2021-11-30 PROCEDURE — 96361 HYDRATE IV INFUSION ADD-ON: CPT | Performed by: EMERGENCY MEDICINE

## 2021-11-30 PROCEDURE — 81001 URINALYSIS AUTO W/SCOPE: CPT | Performed by: EMERGENCY MEDICINE

## 2021-11-30 PROCEDURE — 96374 THER/PROPH/DIAG INJ IV PUSH: CPT | Performed by: EMERGENCY MEDICINE

## 2021-11-30 PROCEDURE — 85025 COMPLETE CBC W/AUTO DIFF WBC: CPT | Performed by: EMERGENCY MEDICINE

## 2021-11-30 RX ORDER — ONDANSETRON 2 MG/ML
4 INJECTION INTRAMUSCULAR; INTRAVENOUS ONCE
Status: DISCONTINUED | OUTPATIENT
Start: 2021-11-30 | End: 2021-11-30

## 2021-11-30 RX ORDER — ONDANSETRON 4 MG/1
4 TABLET, ORALLY DISINTEGRATING ORAL EVERY 4 HOURS PRN
Qty: 10 TABLET | Refills: 0 | Status: SHIPPED | OUTPATIENT
Start: 2021-11-30 | End: 2021-12-07

## 2021-11-30 RX ORDER — KETOROLAC TROMETHAMINE 15 MG/ML
15 INJECTION, SOLUTION INTRAMUSCULAR; INTRAVENOUS ONCE
Status: DISCONTINUED | OUTPATIENT
Start: 2021-11-30 | End: 2021-11-30

## 2021-11-30 RX ORDER — ONDANSETRON 2 MG/ML
4 INJECTION INTRAMUSCULAR; INTRAVENOUS ONCE
Status: COMPLETED | OUTPATIENT
Start: 2021-11-30 | End: 2021-11-30

## 2021-11-30 RX ORDER — ACETAMINOPHEN 500 MG
1000 TABLET ORAL ONCE
Status: COMPLETED | OUTPATIENT
Start: 2021-11-30 | End: 2021-11-30

## 2021-11-30 NOTE — ED PROVIDER NOTES
Patient Seen in: [facility] Emergency Department In [place]      History   Patient presents with:  Nausea/Vomiting/Diarrhea    Stated Complaint: nausea/diarrhea x 3 days.     Subjective:   HPI    40-year-old female presents to the emergency department comp Regular rate and rhythm. Abdomen: NABS. Flat, soft and nontender without masses or rebound. Extremities are atraumatic. Skin is dry without rashes or lesions. Neuro exam: Awake, conversive and moving all 4 extremities well.     ED Course     Labs Revi for C. difficile provided to return to the outpatient lab. Dietary restrictions were given. 2.  Nausea. Antiemetics prescribed.                              Disposition and Plan     Clinical Impression:  Diarrhea, unspecified type  (primary encounter alisha

## 2021-12-08 ENCOUNTER — OFFICE VISIT (OUTPATIENT)
Dept: INTERNAL MEDICINE CLINIC | Facility: CLINIC | Age: 41
End: 2021-12-08
Payer: COMMERCIAL

## 2021-12-08 VITALS
RESPIRATION RATE: 16 BRPM | HEART RATE: 72 BPM | OXYGEN SATURATION: 98 % | DIASTOLIC BLOOD PRESSURE: 80 MMHG | HEIGHT: 62 IN | TEMPERATURE: 98 F | BODY MASS INDEX: 26.5 KG/M2 | WEIGHT: 144 LBS | SYSTOLIC BLOOD PRESSURE: 116 MMHG

## 2021-12-08 DIAGNOSIS — Z00.00 LABORATORY EXAM ORDERED AS PART OF ROUTINE GENERAL MEDICAL EXAMINATION: ICD-10-CM

## 2021-12-08 DIAGNOSIS — J45.20 MILD INTERMITTENT ASTHMA WITHOUT COMPLICATION: ICD-10-CM

## 2021-12-08 DIAGNOSIS — Z82.49 FAMILY HISTORY OF HEART DISEASE: ICD-10-CM

## 2021-12-08 DIAGNOSIS — F41.9 ANXIETY: ICD-10-CM

## 2021-12-08 DIAGNOSIS — Z00.00 ENCOUNTER FOR ROUTINE ADULT MEDICAL EXAMINATION: Primary | ICD-10-CM

## 2021-12-08 PROCEDURE — 3008F BODY MASS INDEX DOCD: CPT | Performed by: INTERNAL MEDICINE

## 2021-12-08 PROCEDURE — 3079F DIAST BP 80-89 MM HG: CPT | Performed by: INTERNAL MEDICINE

## 2021-12-08 PROCEDURE — 99396 PREV VISIT EST AGE 40-64: CPT | Performed by: INTERNAL MEDICINE

## 2021-12-08 PROCEDURE — 3074F SYST BP LT 130 MM HG: CPT | Performed by: INTERNAL MEDICINE

## 2021-12-08 RX ORDER — BUDESONIDE 0.5 MG/2ML
INHALANT ORAL
COMMUNITY
Start: 2021-10-24

## 2021-12-08 RX ORDER — CLONAZEPAM 0.5 MG/1
0.5 TABLET ORAL 2 TIMES DAILY PRN
Qty: 30 TABLET | Refills: 0 | Status: SHIPPED | OUTPATIENT
Start: 2021-12-08

## 2021-12-08 RX ORDER — PREDNISONE 10 MG/1
TABLET ORAL
COMMUNITY
Start: 2021-10-24

## 2021-12-08 RX ORDER — ALPRAZOLAM 0.25 MG/1
TABLET ORAL 2 TIMES DAILY PRN
Qty: 20 TABLET | Refills: 0 | Status: CANCELLED | OUTPATIENT
Start: 2021-12-08

## 2021-12-08 NOTE — PROGRESS NOTES
[facility] Internal Medicine Office Note  Chief Complaint:   Patient presents with:  Physical      HPI:   This is a 35year old female coming in for physical   HPI    ACT 24  Asthma symptoms well controlled  Takes albuterol as needed    Anxiety a NEBULIZER TWO TO THREE TIMES DAILY (Patient not taking: Reported on 12/8/2021)     • predniSONE 10 MG Oral Tab  (Patient not taking: Reported on 12/8/2021)     • Albuterol Sulfate  (90 Base) MCG/ACT Inhalation Aero Soln INL 2 PFS PO PRE SPORTS AND Q adult medical examination  (primary encounter diagnosis)  Laboratory exam ordered as part of routine general medical examination  Anxiety  Family history of heart disease      The plan is as follows  Hilario Renner was seen today for physical.    Diagnoses and all Patient verbalizes understanding. Patient is notified to call with any questions, complications, allergies, or worsening or changing symptoms. Patient is to call with any side effects or complications from the treatments as a result of today.      Sumi Aguilar

## 2022-01-09 ENCOUNTER — TELEPHONE (OUTPATIENT)
Dept: OBGYN CLINIC | Facility: CLINIC | Age: 42
End: 2022-01-09

## 2022-01-09 DIAGNOSIS — N92.6 IRREGULAR BLEEDING: Primary | ICD-10-CM

## 2022-01-09 NOTE — TELEPHONE ENCOUNTER
Regarding: Irregular bleeding   ----- Message from Aristeo Siddiqui RN sent at 1/8/2022  8:19 PM CST -----       ----- Message from Horace Napier to Angeles Alford MD sent at 1/6/2022 10:36 AM -----   I had one in December 2020.     Can you plac -----       From:Patricia Mcdonald       Sent:1/3/2022 10:34 AM CST         To:Ana Bishop MD    Subject:Irregular bleeding     Hi Dr Anita Salguero. I just started to have irregular bleeding  (14 days after last period).   I have been on spironolactactone

## 2022-01-20 ENCOUNTER — HOSPITAL ENCOUNTER (OUTPATIENT)
Dept: ULTRASOUND IMAGING | Age: 42
Discharge: HOME | End: 2022-01-20
Attending: OBSTETRICS & GYNECOLOGY
Payer: COMMERCIAL

## 2022-01-20 DIAGNOSIS — N92.6 IRREGULAR BLEEDING: ICD-10-CM

## 2022-01-20 PROCEDURE — 76830 TRANSVAGINAL US NON-OB: CPT | Performed by: OBSTETRICS & GYNECOLOGY

## 2022-01-20 PROCEDURE — 76856 US EXAM PELVIC COMPLETE: CPT | Performed by: OBSTETRICS & GYNECOLOGY

## 2022-03-01 NOTE — TELEPHONE ENCOUNTER
LOV: 12/8/2021 with Dr. Norma Goel  RTC: 6 months  Last Relevant Labs: 11/30/2021  Filled: 12/8/2021    #30 with 0 refills    Future Appointments   Date Time Provider Mateusz Brown   3/17/2022 12:00 PM PF CT RM1 PF CT [place]

## 2022-03-02 RX ORDER — CLONAZEPAM 0.5 MG/1
0.5 TABLET ORAL 2 TIMES DAILY PRN
Qty: 30 TABLET | Refills: 0 | Status: SHIPPED | OUTPATIENT
Start: 2022-03-02

## 2022-04-26 NOTE — TELEPHONE ENCOUNTER
LOV: 12/8/2021 with Dr. Omari Gonzalez  RTC: 6 months  Last Relevant Labs: 11/30/2021   Filled: 3/2/2022    #30 with 0 refills    No future appointments.

## 2022-04-27 RX ORDER — CLONAZEPAM 0.5 MG/1
0.5 TABLET ORAL 2 TIMES DAILY PRN
Qty: 30 TABLET | Refills: 3 | Status: SHIPPED | OUTPATIENT
Start: 2022-04-27

## 2022-05-02 ENCOUNTER — LAB ENCOUNTER (OUTPATIENT)
Dept: LAB | Age: 42
End: 2022-05-02
Attending: INTERNAL MEDICINE
Payer: COMMERCIAL

## 2022-05-02 DIAGNOSIS — Z00.00 LABORATORY EXAM ORDERED AS PART OF ROUTINE GENERAL MEDICAL EXAMINATION: ICD-10-CM

## 2022-05-02 LAB
CHOLEST SERPL-MCNC: 185 MG/DL (ref ?–200)
FASTING PATIENT LIPID ANSWER: YES
HDLC SERPL-MCNC: 85 MG/DL (ref 40–59)
LDLC SERPL CALC-MCNC: 84 MG/DL (ref ?–100)
NONHDLC SERPL-MCNC: 100 MG/DL (ref ?–130)
TRIGL SERPL-MCNC: 92 MG/DL (ref 30–149)
TSI SER-ACNC: 2.73 MIU/ML (ref 0.36–3.74)
VLDLC SERPL CALC-MCNC: 15 MG/DL (ref 0–30)

## 2022-05-02 PROCEDURE — 80061 LIPID PANEL: CPT | Performed by: INTERNAL MEDICINE

## 2022-05-02 PROCEDURE — 84443 ASSAY THYROID STIM HORMONE: CPT | Performed by: INTERNAL MEDICINE

## 2022-12-19 ENCOUNTER — HOSPITAL ENCOUNTER (OUTPATIENT)
Facility: HOSPITAL | Age: 42
Setting detail: OBSERVATION
Discharge: HOME | End: 2022-12-20
Attending: EMERGENCY MEDICINE | Admitting: INTERNAL MEDICINE
Payer: COMMERCIAL

## 2022-12-19 ENCOUNTER — APPOINTMENT (OUTPATIENT)
Dept: GENERAL RADIOLOGY | Facility: HOSPITAL | Age: 42
End: 2022-12-19
Attending: EMERGENCY MEDICINE
Payer: COMMERCIAL

## 2022-12-19 DIAGNOSIS — I47.1 SVT (SUPRAVENTRICULAR TACHYCARDIA) (HCC): Primary | ICD-10-CM

## 2022-12-19 DIAGNOSIS — R77.8 ELEVATED TROPONIN: ICD-10-CM

## 2022-12-19 DIAGNOSIS — I24.8 DEMAND ISCHEMIA OF MYOCARDIUM (HCC): ICD-10-CM

## 2022-12-19 LAB
ALBUMIN SERPL-MCNC: 4 G/DL (ref 3.4–5)
ALBUMIN/GLOB SERPL: 1 {RATIO} (ref 1–2)
ALP LIVER SERPL-CCNC: 67 U/L
ALT SERPL-CCNC: 47 U/L
ANION GAP SERPL CALC-SCNC: 8 MMOL/L (ref 0–18)
AST SERPL-CCNC: 60 U/L (ref 15–37)
BASOPHILS # BLD AUTO: 0.08 X10(3) UL (ref 0–0.2)
BASOPHILS NFR BLD AUTO: 0.4 %
BILIRUB SERPL-MCNC: 0.5 MG/DL (ref 0.1–2)
BUN BLD-MCNC: 18 MG/DL (ref 7–18)
CALCIUM BLD-MCNC: 9.8 MG/DL (ref 8.5–10.1)
CHLORIDE SERPL-SCNC: 104 MMOL/L (ref 98–112)
CHOLEST SERPL-MCNC: 193 MG/DL (ref ?–200)
CO2 SERPL-SCNC: 26 MMOL/L (ref 21–32)
CREAT BLD-MCNC: 0.96 MG/DL
EOSINOPHIL # BLD AUTO: 0.06 X10(3) UL (ref 0–0.7)
EOSINOPHIL NFR BLD AUTO: 0.3 %
ERYTHROCYTE [DISTWIDTH] IN BLOOD BY AUTOMATED COUNT: 12.9 %
GFR SERPLBLD BASED ON 1.73 SQ M-ARVRAT: 75 ML/MIN/1.73M2 (ref 60–?)
GLOBULIN PLAS-MCNC: 4 G/DL (ref 2.8–4.4)
GLUCOSE BLD-MCNC: 144 MG/DL (ref 70–99)
HCT VFR BLD AUTO: 49.4 %
HDLC SERPL-MCNC: 113 MG/DL (ref 40–59)
HGB BLD-MCNC: 16.4 G/DL
IMM GRANULOCYTES # BLD AUTO: 0.07 X10(3) UL (ref 0–1)
IMM GRANULOCYTES NFR BLD: 0.4 %
LDLC SERPL CALC-MCNC: 62 MG/DL (ref ?–100)
LYMPHOCYTES # BLD AUTO: 5.78 X10(3) UL (ref 1–4)
LYMPHOCYTES NFR BLD AUTO: 31.4 %
MCH RBC QN AUTO: 30.5 PG (ref 26–34)
MCHC RBC AUTO-ENTMCNC: 33.2 G/DL (ref 31–37)
MCV RBC AUTO: 92 FL
MONOCYTES # BLD AUTO: 1.43 X10(3) UL (ref 0.1–1)
MONOCYTES NFR BLD AUTO: 7.8 %
NEUTROPHILS # BLD AUTO: 10.99 X10 (3) UL (ref 1.5–7.7)
NEUTROPHILS # BLD AUTO: 10.99 X10(3) UL (ref 1.5–7.7)
NEUTROPHILS NFR BLD AUTO: 59.7 %
NONHDLC SERPL-MCNC: 80 MG/DL (ref ?–130)
OSMOLALITY SERPL CALC.SUM OF ELEC: 290 MOSM/KG (ref 275–295)
PLATELET # BLD AUTO: 311 10(3)UL (ref 150–450)
POTASSIUM SERPL-SCNC: 3.4 MMOL/L (ref 3.5–5.1)
PROT SERPL-MCNC: 8 G/DL (ref 6.4–8.2)
RBC # BLD AUTO: 5.37 X10(6)UL
SARS-COV-2 RNA RESP QL NAA+PROBE: NOT DETECTED
SODIUM SERPL-SCNC: 138 MMOL/L (ref 136–145)
TRIGL SERPL-MCNC: 104 MG/DL (ref 30–149)
TROPONIN I HIGH SENSITIVITY: 92 NG/L
VLDLC SERPL CALC-MCNC: 15 MG/DL (ref 0–30)
WBC # BLD AUTO: 18.4 X10(3) UL (ref 4–11)

## 2022-12-19 PROCEDURE — 71045 X-RAY EXAM CHEST 1 VIEW: CPT | Performed by: EMERGENCY MEDICINE

## 2022-12-19 PROCEDURE — 99220 INITIAL OBSERVATION CARE,LEVL III: CPT | Performed by: INTERNAL MEDICINE

## 2022-12-19 RX ORDER — ADENOSINE 3 MG/ML
6 INJECTION, SOLUTION INTRAVENOUS ONCE
Status: COMPLETED | OUTPATIENT
Start: 2022-12-19 | End: 2022-12-19

## 2022-12-19 RX ORDER — HEPARIN SODIUM 1000 [USP'U]/ML
60 INJECTION, SOLUTION INTRAVENOUS; SUBCUTANEOUS ONCE
Status: COMPLETED | OUTPATIENT
Start: 2022-12-19 | End: 2022-12-19

## 2022-12-19 RX ORDER — HEPARIN SODIUM AND DEXTROSE 10000; 5 [USP'U]/100ML; G/100ML
12 INJECTION INTRAVENOUS ONCE
Status: COMPLETED | OUTPATIENT
Start: 2022-12-19 | End: 2022-12-20

## 2022-12-20 ENCOUNTER — APPOINTMENT (OUTPATIENT)
Dept: CV DIAGNOSTICS | Facility: HOSPITAL | Age: 42
End: 2022-12-20
Attending: INTERNAL MEDICINE
Payer: COMMERCIAL

## 2022-12-20 VITALS
DIASTOLIC BLOOD PRESSURE: 77 MMHG | RESPIRATION RATE: 18 BRPM | WEIGHT: 149.5 LBS | OXYGEN SATURATION: 98 % | TEMPERATURE: 98 F | HEIGHT: 62 IN | BODY MASS INDEX: 27.51 KG/M2 | HEART RATE: 82 BPM | SYSTOLIC BLOOD PRESSURE: 111 MMHG

## 2022-12-20 PROBLEM — D72.829 LEUKOCYTOSIS: Status: ACTIVE | Noted: 2022-12-20

## 2022-12-20 PROBLEM — I24.89 DEMAND ISCHEMIA OF MYOCARDIUM: Status: ACTIVE | Noted: 2022-12-20

## 2022-12-20 PROBLEM — I24.8 DEMAND ISCHEMIA OF MYOCARDIUM (HCC): Status: ACTIVE | Noted: 2022-12-20

## 2022-12-20 PROBLEM — R07.9 CHEST PAIN: Status: ACTIVE | Noted: 2022-12-20

## 2022-12-20 PROBLEM — I47.10 SVT (SUPRAVENTRICULAR TACHYCARDIA) (HCC): Status: ACTIVE | Noted: 2022-12-20

## 2022-12-20 PROBLEM — R79.89 ELEVATED TROPONIN: Status: ACTIVE | Noted: 2022-12-20

## 2022-12-20 PROBLEM — R73.9 HYPERGLYCEMIA: Status: ACTIVE | Noted: 2022-12-20

## 2022-12-20 PROBLEM — R77.8 ELEVATED TROPONIN: Status: ACTIVE | Noted: 2022-12-20

## 2022-12-20 PROBLEM — E87.6 HYPOKALEMIA: Status: ACTIVE | Noted: 2022-12-20

## 2022-12-20 PROBLEM — I47.1 SVT (SUPRAVENTRICULAR TACHYCARDIA) (HCC): Status: ACTIVE | Noted: 2022-12-20

## 2022-12-20 PROBLEM — I47.10 SVT (SUPRAVENTRICULAR TACHYCARDIA): Status: ACTIVE | Noted: 2022-12-20

## 2022-12-20 PROBLEM — I24.89 DEMAND ISCHEMIA OF MYOCARDIUM (HCC): Status: ACTIVE | Noted: 2022-12-20

## 2022-12-20 LAB
APTT PPP: 29.6 SECONDS (ref 23.3–35.6)
APTT PPP: 42.2 SECONDS (ref 23.3–35.6)
ATRIAL RATE: 106 BPM
ATRIAL RATE: 208 BPM
ATRIAL RATE: 85 BPM
BILIRUB UR QL STRIP.AUTO: NEGATIVE
CLARITY UR REFRACT.AUTO: CLEAR
COLOR UR AUTO: YELLOW
GLUCOSE UR STRIP.AUTO-MCNC: NEGATIVE MG/DL
KETONES UR STRIP.AUTO-MCNC: 15 MG/DL
LEUKOCYTE ESTERASE UR QL STRIP.AUTO: NEGATIVE
NITRITE UR QL STRIP.AUTO: NEGATIVE
P AXIS: 51 DEGREES
P AXIS: 69 DEGREES
P-R INTERVAL: 116 MS
P-R INTERVAL: 124 MS
PH UR STRIP.AUTO: 6.5 [PH] (ref 5–8)
PROT UR STRIP.AUTO-MCNC: NEGATIVE MG/DL
Q-T INTERVAL: 206 MS
Q-T INTERVAL: 334 MS
Q-T INTERVAL: 392 MS
QRS DURATION: 76 MS
QRS DURATION: 82 MS
QRS DURATION: 86 MS
QTC CALCULATION (BEZET): 386 MS
QTC CALCULATION (BEZET): 443 MS
QTC CALCULATION (BEZET): 466 MS
R AXIS: 68 DEGREES
R AXIS: 69 DEGREES
R AXIS: 75 DEGREES
SP GR UR STRIP.AUTO: 1.01 (ref 1–1.03)
T AXIS: -66 DEGREES
T AXIS: 14 DEGREES
T AXIS: 31 DEGREES
TROPONIN I HIGH SENSITIVITY: 1018 NG/L
TSI SER-ACNC: 3.61 MIU/ML (ref 0.36–3.74)
UROBILINOGEN UR STRIP.AUTO-MCNC: 0.2 MG/DL
VENTRICULAR RATE: 106 BPM
VENTRICULAR RATE: 212 BPM
VENTRICULAR RATE: 85 BPM

## 2022-12-20 PROCEDURE — 93306 TTE W/DOPPLER COMPLETE: CPT | Performed by: INTERNAL MEDICINE

## 2022-12-20 PROCEDURE — 99217 OBSERVATION CARE DISCHARGE: CPT | Performed by: HOSPITALIST

## 2022-12-20 RX ORDER — HEPARIN SODIUM AND DEXTROSE 10000; 5 [USP'U]/100ML; G/100ML
INJECTION INTRAVENOUS CONTINUOUS
Status: DISCONTINUED | OUTPATIENT
Start: 2022-12-20 | End: 2022-12-20

## 2022-12-20 RX ORDER — DILTIAZEM HYDROCHLORIDE 120 MG/1
120 CAPSULE, EXTENDED RELEASE ORAL DAILY
Status: DISCONTINUED | OUTPATIENT
Start: 2022-12-20 | End: 2022-12-20

## 2022-12-20 RX ORDER — DILTIAZEM HYDROCHLORIDE 120 MG/1
120 CAPSULE, COATED, EXTENDED RELEASE ORAL DAILY
Qty: 30 CAPSULE | Refills: 3 | Status: SHIPPED | OUTPATIENT
Start: 2022-12-20

## 2022-12-20 RX ORDER — BISACODYL 10 MG
10 SUPPOSITORY, RECTAL RECTAL
Status: DISCONTINUED | OUTPATIENT
Start: 2022-12-20 | End: 2022-12-20

## 2022-12-20 RX ORDER — FLUTICASONE FUROATE, UMECLIDINIUM BROMIDE AND VILANTEROL TRIFENATATE 200; 62.5; 25 UG/1; UG/1; UG/1
1 POWDER RESPIRATORY (INHALATION) EVERY MORNING
COMMUNITY
Start: 2022-12-12

## 2022-12-20 RX ORDER — DOXEPIN HYDROCHLORIDE 50 MG/1
1 CAPSULE ORAL DAILY
COMMUNITY

## 2022-12-20 RX ORDER — ACETAMINOPHEN 500 MG
500 TABLET ORAL EVERY 4 HOURS PRN
Status: DISCONTINUED | OUTPATIENT
Start: 2022-12-20 | End: 2022-12-20

## 2022-12-20 RX ORDER — SENNOSIDES 8.6 MG
17.2 TABLET ORAL NIGHTLY PRN
Status: DISCONTINUED | OUTPATIENT
Start: 2022-12-20 | End: 2022-12-20

## 2022-12-20 RX ORDER — PROCHLORPERAZINE EDISYLATE 5 MG/ML
5 INJECTION INTRAMUSCULAR; INTRAVENOUS EVERY 8 HOURS PRN
Status: DISCONTINUED | OUTPATIENT
Start: 2022-12-20 | End: 2022-12-20

## 2022-12-20 RX ORDER — ALBUTEROL SULFATE 2.5 MG/3ML
2.5 SOLUTION RESPIRATORY (INHALATION) 4 TIMES DAILY PRN
COMMUNITY
Start: 2022-12-08

## 2022-12-20 RX ORDER — ONDANSETRON 2 MG/ML
4 INJECTION INTRAMUSCULAR; INTRAVENOUS EVERY 6 HOURS PRN
Status: DISCONTINUED | OUTPATIENT
Start: 2022-12-20 | End: 2022-12-20

## 2022-12-20 RX ORDER — ONDANSETRON 2 MG/ML
4 INJECTION INTRAMUSCULAR; INTRAVENOUS EVERY 4 HOURS PRN
Status: ACTIVE | OUTPATIENT
Start: 2022-12-20 | End: 2022-12-20

## 2022-12-20 RX ORDER — HYDROMORPHONE HYDROCHLORIDE 1 MG/ML
0.5 INJECTION, SOLUTION INTRAMUSCULAR; INTRAVENOUS; SUBCUTANEOUS EVERY 30 MIN PRN
Status: ACTIVE | OUTPATIENT
Start: 2022-12-20 | End: 2022-12-20

## 2022-12-20 RX ORDER — POLYETHYLENE GLYCOL 3350 17 G/17G
17 POWDER, FOR SOLUTION ORAL DAILY PRN
Status: DISCONTINUED | OUTPATIENT
Start: 2022-12-20 | End: 2022-12-20

## 2022-12-20 RX ORDER — SODIUM PHOSPHATE, DIBASIC AND SODIUM PHOSPHATE, MONOBASIC 7; 19 G/133ML; G/133ML
1 ENEMA RECTAL ONCE AS NEEDED
Status: DISCONTINUED | OUTPATIENT
Start: 2022-12-20 | End: 2022-12-20

## 2022-12-20 NOTE — PLAN OF CARE
Patient is alert and oriented x 4. Ambulatory with standby assistance. Denies cardiac discomfort. Heparin gtt infusing. Plan for echo this morning. Patient is aware. Spouse at bedside. Encourage to use call light for assistance. Bed low and locked. Call light within reached.  Wctm and notify service   Problem: Patient/Family Goals  Goal: Patient/Family Long Term Goal  Description: Patient's Long Term Goal: to go home    Interventions:  - hep gtt  -labs  -tele  -cards to see  - See additional Care Plan goals for specific interventions  Outcome: Progressing  Goal: Patient/Family Short Term Goal  Description: Patient's Short Term Goal: to figure out whats going on  Interventions:   - cards to see  -labs  -tele  -ECHO  - See additional Care Plan goals for specific interventions  Outcome: Progressing     Problem: HEMATOLOGIC - ADULT  Goal: Free from bleeding injury  Description: (Example usage: patient with low platelets)  INTERVENTIONS:  - Avoid intramuscular injections, enemas and rectal medication administration  - Ensure safe mobilization of patient  - Hold pressure on venipuncture sites to achieve adequate hemostasis  - Assess for signs and symptoms of internal bleeding  - Monitor lab trends  - Patient is to report abnormal signs of bleeding to staff  - Avoid use of toothpicks and dental floss  - Use electric shaver for shaving  - Use soft bristle tooth brush  - Limit straining and forceful nose blowing  Outcome: Progressing     Problem: CARDIOVASCULAR - ADULT  Goal: Maintains optimal cardiac output and hemodynamic stability  Description: INTERVENTIONS:  - Monitor vital signs, rhythm, and trends  - Monitor for bleeding, hypotension and signs of decreased cardiac output  - Evaluate effectiveness of vasoactive medications to optimize hemodynamic stability  - Monitor arterial and/or venous puncture sites for bleeding and/or hematoma  - Assess quality of pulses, skin color and temperature  - Assess for signs of decreased coronary artery perfusion - ex.  Angina  - Evaluate fluid balance, assess for edema, trend weights  Outcome: Progressing  Goal: Absence of cardiac arrhythmias or at baseline  Description: INTERVENTIONS:  - Continuous cardiac monitoring, monitor vital signs, obtain 12 lead EKG if indicated  - Evaluate effectiveness of antiarrhythmic and heart rate control medications as ordered  - Initiate emergency measures for life threatening arrhythmias  - Monitor electrolytes and administer replacement therapy as ordered  Outcome: Progressing

## 2022-12-20 NOTE — PLAN OF CARE
Received pt at 0330 from ED. Pt is A&O x 4; follows commands. Pt denies CP or SOB. Pt is on room air with O2 saturation at  98, VSS, and NSR on tele. Pt has Heparin gtt infusing @ 8ml/hr into R AC IV access. NPO; continent of bowel and bladder. Pt denies pain and ambulates independently. POC discussed with pt who verbalized understanding. Admission assessment and documentation completed, pt good historian. Potassium coverage given. [id number] [facility] safety plan in place; bed in low position, alarm on, call light and personal items within reach.     Problem: Patient/Family Goals  Goal: Patient/Family Long Term Goal  Description: Patient's Long Term Goal: to go home    Interventions:  - hep gtt  -labs  -tele  -cards to see  - See additional Care Plan goals for specific interventions  Outcome: Progressing  Goal: Patient/Family Short Term Goal  Description: Patient's Short Term Goal: to figure out whats going on  Interventions:   - cards to see  -labs  -tele  -ECHO  - See additional Care Plan goals for specific interventions  Outcome: Progressing

## 2022-12-20 NOTE — PROGRESS NOTES
Patient is being discharge to home. Patient walked the halls. Denies chest discomfort., lightheadedness, dizziness, and sob. Avs printed out and explained to patient follow up appointments and  new medications. Patient acknowledge understanding. Iv and cardiac monitor dc. Transporter place for wheelchair.

## 2022-12-20 NOTE — ED INITIAL ASSESSMENT (HPI)
Pt arrives to ED for c/o of palpitations. Pt states started around 5:30pm states felt burning and chest uncomfortable.

## 2022-12-20 NOTE — ED QUICK NOTES
Orders for admission, patient is aware of plan and ready to go upstairs. Any questions, please call ED RN Melissa at extension [phone]. Patient Covid vaccination status: Fully vaccinated     COVID Test Ordered in ED: Rapid SARS-CoV-2 by PCR    COVID Suspicion at Admission: Low clinical suspicion for COVID    Running Infusions:  Heparin 8mL/hr    Mental Status/LOC at time of transport: A&Ox4    Other pertinent information: Patient ambulatory. Pt denies chest pain at this time.    CIWA score: N/A   NIH score:  N/A

## 2022-12-21 ENCOUNTER — PATIENT OUTREACH (OUTPATIENT)
Dept: CASE MANAGEMENT | Age: 42
End: 2022-12-21

## 2022-12-21 DIAGNOSIS — I47.1 SVT (SUPRAVENTRICULAR TACHYCARDIA) (HCC): ICD-10-CM

## 2022-12-21 DIAGNOSIS — Z02.9 ENCOUNTERS FOR UNSPECIFIED ADMINISTRATIVE PURPOSE: ICD-10-CM

## 2022-12-21 PROCEDURE — 1111F DSCHRG MED/CURRENT MED MERGE: CPT

## 2022-12-21 NOTE — PROGRESS NOTES
VM received; pt called back requesting assistance w/scheduling apt (dc 12/20)  Apt made:   u 12/22 @10:00am   Confirmed w/pt  Closing encounter

## 2022-12-21 NOTE — PROGRESS NOTES
[facility] contact information provided as well as [facility] office number, [phone].

## 2022-12-21 NOTE — PROGRESS NOTES
VM received; pt requesting assistance w/scheduling apt (dc 12/20)    Dr Chapo Palmer  [facility] [address] [phone]  Follow up 2 weeks  LVM if still need assistance to call [phone]  Closing encounter

## 2022-12-23 ENCOUNTER — TELEMEDICINE (OUTPATIENT)
Dept: INTERNAL MEDICINE CLINIC | Facility: CLINIC | Age: 42
End: 2022-12-23
Payer: COMMERCIAL

## 2022-12-23 DIAGNOSIS — I47.1 SVT (SUPRAVENTRICULAR TACHYCARDIA) (HCC): Primary | ICD-10-CM

## 2022-12-23 DIAGNOSIS — J45.901 ASTHMA WITH ACUTE EXACERBATION, UNSPECIFIED ASTHMA SEVERITY, UNSPECIFIED WHETHER PERSISTENT: ICD-10-CM

## 2022-12-23 DIAGNOSIS — U07.1 COVID-19: ICD-10-CM

## 2022-12-23 DIAGNOSIS — R77.8 ELEVATED TROPONIN: ICD-10-CM

## 2022-12-23 RX ORDER — NIRMATRELVIR AND RITONAVIR 300-100 MG
KIT ORAL
Qty: 30 TABLET | Refills: 0 | Status: SHIPPED | OUTPATIENT
Start: 2022-12-23 | End: 2022-12-28

## 2022-12-23 RX ORDER — NIRMATRELVIR AND RITONAVIR 300-100 MG
KIT ORAL
Qty: 30 TABLET | Refills: 0 | Status: CANCELLED
Start: 2022-12-23 | End: 2022-12-28

## 2023-01-11 ENCOUNTER — OFFICE VISIT (OUTPATIENT)
Dept: OBGYN CLINIC | Facility: CLINIC | Age: 43
End: 2023-01-11
Payer: COMMERCIAL

## 2023-01-11 VITALS
SYSTOLIC BLOOD PRESSURE: 128 MMHG | DIASTOLIC BLOOD PRESSURE: 82 MMHG | BODY MASS INDEX: 27.45 KG/M2 | HEART RATE: 77 BPM | WEIGHT: 149.19 LBS | HEIGHT: 62 IN

## 2023-01-11 DIAGNOSIS — Z12.4 SCREENING FOR MALIGNANT NEOPLASM OF CERVIX: ICD-10-CM

## 2023-01-11 DIAGNOSIS — Z01.419 WELL WOMAN EXAM WITH ROUTINE GYNECOLOGICAL EXAM: Primary | ICD-10-CM

## 2023-01-11 DIAGNOSIS — Z12.31 ENCOUNTER FOR SCREENING MAMMOGRAM FOR MALIGNANT NEOPLASM OF BREAST: ICD-10-CM

## 2023-01-11 PROCEDURE — 3079F DIAST BP 80-89 MM HG: CPT | Performed by: OBSTETRICS & GYNECOLOGY

## 2023-01-11 PROCEDURE — 87624 HPV HI-RISK TYP POOLED RSLT: CPT | Performed by: OBSTETRICS & GYNECOLOGY

## 2023-01-11 PROCEDURE — 3008F BODY MASS INDEX DOCD: CPT | Performed by: OBSTETRICS & GYNECOLOGY

## 2023-01-11 PROCEDURE — 3074F SYST BP LT 130 MM HG: CPT | Performed by: OBSTETRICS & GYNECOLOGY

## 2023-01-11 PROCEDURE — 99396 PREV VISIT EST AGE 40-64: CPT | Performed by: OBSTETRICS & GYNECOLOGY

## 2023-01-11 RX ORDER — LEVALBUTEROL INHALATION SOLUTION 0.63 MG/3ML
SOLUTION RESPIRATORY (INHALATION)
COMMUNITY
Start: 2023-01-01

## 2023-01-11 RX ORDER — PREDNISONE 10 MG/1
TABLET ORAL
COMMUNITY
Start: 2022-12-23

## 2023-01-11 RX ORDER — AZITHROMYCIN 250 MG/1
TABLET, FILM COATED ORAL
COMMUNITY
Start: 2022-12-23

## 2023-01-12 LAB — HPV I/H RISK 1 DNA SPEC QL NAA+PROBE: NEGATIVE

## 2023-01-27 ENCOUNTER — HOSPITAL ENCOUNTER (OUTPATIENT)
Dept: MAMMOGRAPHY | Age: 43
Discharge: HOME | End: 2023-01-27
Attending: OBSTETRICS & GYNECOLOGY
Payer: COMMERCIAL

## 2023-01-27 ENCOUNTER — OFFICE VISIT (OUTPATIENT)
Dept: INTERNAL MEDICINE CLINIC | Facility: CLINIC | Age: 43
End: 2023-01-27
Payer: COMMERCIAL

## 2023-01-27 ENCOUNTER — LAB ENCOUNTER (OUTPATIENT)
Dept: LAB | Age: 43
End: 2023-01-27
Attending: INTERNAL MEDICINE
Payer: COMMERCIAL

## 2023-01-27 VITALS
WEIGHT: 145.19 LBS | BODY MASS INDEX: 26.72 KG/M2 | DIASTOLIC BLOOD PRESSURE: 70 MMHG | TEMPERATURE: 98 F | SYSTOLIC BLOOD PRESSURE: 102 MMHG | RESPIRATION RATE: 16 BRPM | OXYGEN SATURATION: 100 % | HEART RATE: 68 BPM | HEIGHT: 61.75 IN

## 2023-01-27 DIAGNOSIS — Z00.00 ENCOUNTER FOR ROUTINE ADULT MEDICAL EXAMINATION: Primary | ICD-10-CM

## 2023-01-27 DIAGNOSIS — F41.9 ANXIETY: ICD-10-CM

## 2023-01-27 DIAGNOSIS — Z12.31 ENCOUNTER FOR SCREENING MAMMOGRAM FOR MALIGNANT NEOPLASM OF BREAST: ICD-10-CM

## 2023-01-27 DIAGNOSIS — Z00.00 LABORATORY EXAM ORDERED AS PART OF ROUTINE GENERAL MEDICAL EXAMINATION: ICD-10-CM

## 2023-01-27 DIAGNOSIS — J45.20 MILD INTERMITTENT ASTHMA WITHOUT COMPLICATION: ICD-10-CM

## 2023-01-27 LAB
ALBUMIN SERPL-MCNC: 3.9 G/DL (ref 3.4–5)
ALBUMIN/GLOB SERPL: 1.1 {RATIO} (ref 1–2)
ALP LIVER SERPL-CCNC: 60 U/L
ALT SERPL-CCNC: 21 U/L
ANION GAP SERPL CALC-SCNC: 4 MMOL/L (ref 0–18)
AST SERPL-CCNC: 16 U/L (ref 15–37)
BASOPHILS # BLD AUTO: 0.04 X10(3) UL (ref 0–0.2)
BASOPHILS NFR BLD AUTO: 0.5 %
BILIRUB SERPL-MCNC: 0.4 MG/DL (ref 0.1–2)
BUN BLD-MCNC: 13 MG/DL (ref 7–18)
CALCIUM BLD-MCNC: 9.1 MG/DL (ref 8.5–10.1)
CHLORIDE SERPL-SCNC: 108 MMOL/L (ref 98–112)
CO2 SERPL-SCNC: 28 MMOL/L (ref 21–32)
CREAT BLD-MCNC: 0.76 MG/DL
EOSINOPHIL # BLD AUTO: 0.06 X10(3) UL (ref 0–0.7)
EOSINOPHIL NFR BLD AUTO: 0.8 %
ERYTHROCYTE [DISTWIDTH] IN BLOOD BY AUTOMATED COUNT: 13.1 %
FASTING STATUS PATIENT QL REPORTED: NO
GFR SERPLBLD BASED ON 1.73 SQ M-ARVRAT: 99 ML/MIN/1.73M2 (ref 60–?)
GLOBULIN PLAS-MCNC: 3.6 G/DL (ref 2.8–4.4)
GLUCOSE BLD-MCNC: 96 MG/DL (ref 70–99)
HCT VFR BLD AUTO: 45 %
HGB BLD-MCNC: 14.6 G/DL
IMM GRANULOCYTES # BLD AUTO: 0.03 X10(3) UL (ref 0–1)
IMM GRANULOCYTES NFR BLD: 0.4 %
LYMPHOCYTES # BLD AUTO: 2.67 X10(3) UL (ref 1–4)
LYMPHOCYTES NFR BLD AUTO: 33.5 %
MCH RBC QN AUTO: 30.1 PG (ref 26–34)
MCHC RBC AUTO-ENTMCNC: 32.4 G/DL (ref 31–37)
MCV RBC AUTO: 92.8 FL
MONOCYTES # BLD AUTO: 0.6 X10(3) UL (ref 0.1–1)
MONOCYTES NFR BLD AUTO: 7.5 %
NEUTROPHILS # BLD AUTO: 4.56 X10 (3) UL (ref 1.5–7.7)
NEUTROPHILS # BLD AUTO: 4.56 X10(3) UL (ref 1.5–7.7)
NEUTROPHILS NFR BLD AUTO: 57.3 %
OSMOLALITY SERPL CALC.SUM OF ELEC: 290 MOSM/KG (ref 275–295)
PLATELET # BLD AUTO: 312 10(3)UL (ref 150–450)
POTASSIUM SERPL-SCNC: 3.8 MMOL/L (ref 3.5–5.1)
PROT SERPL-MCNC: 7.5 G/DL (ref 6.4–8.2)
RBC # BLD AUTO: 4.85 X10(6)UL
SODIUM SERPL-SCNC: 140 MMOL/L (ref 136–145)
WBC # BLD AUTO: 8 X10(3) UL (ref 4–11)

## 2023-01-27 PROCEDURE — 3074F SYST BP LT 130 MM HG: CPT | Performed by: INTERNAL MEDICINE

## 2023-01-27 PROCEDURE — 77067 SCR MAMMO BI INCL CAD: CPT | Performed by: OBSTETRICS & GYNECOLOGY

## 2023-01-27 PROCEDURE — 3078F DIAST BP <80 MM HG: CPT | Performed by: INTERNAL MEDICINE

## 2023-01-27 PROCEDURE — 77063 BREAST TOMOSYNTHESIS BI: CPT | Performed by: OBSTETRICS & GYNECOLOGY

## 2023-01-27 PROCEDURE — 80053 COMPREHEN METABOLIC PANEL: CPT

## 2023-01-27 PROCEDURE — 99396 PREV VISIT EST AGE 40-64: CPT | Performed by: INTERNAL MEDICINE

## 2023-01-27 PROCEDURE — 36415 COLL VENOUS BLD VENIPUNCTURE: CPT

## 2023-01-27 PROCEDURE — 85025 COMPLETE CBC W/AUTO DIFF WBC: CPT

## 2023-01-27 PROCEDURE — 3008F BODY MASS INDEX DOCD: CPT | Performed by: INTERNAL MEDICINE

## 2023-01-27 RX ORDER — ESCITALOPRAM OXALATE 10 MG/1
10 TABLET ORAL DAILY
Qty: 30 TABLET | Refills: 1 | Status: SHIPPED | OUTPATIENT
Start: 2023-01-27

## 2023-01-27 RX ORDER — KETOCONAZOLE 20 MG/ML
SHAMPOO TOPICAL
COMMUNITY
Start: 2023-01-10

## 2023-01-27 RX ORDER — CLONAZEPAM 0.5 MG/1
0.5 TABLET ORAL 2 TIMES DAILY PRN
Qty: 30 TABLET | Refills: 3 | Status: SHIPPED | OUTPATIENT
Start: 2023-01-27

## 2023-02-15 ENCOUNTER — TELEPHONE (OUTPATIENT)
Dept: INTERNAL MEDICINE CLINIC | Facility: CLINIC | Age: 43
End: 2023-02-15

## 2023-02-15 NOTE — TELEPHONE ENCOUNTER
[facility]     [facility] [address], [address]    Fax: [phone]

## 2023-05-01 ENCOUNTER — TELEPHONE (OUTPATIENT)
Dept: OBGYN CLINIC | Facility: CLINIC | Age: 43
End: 2023-05-01

## 2023-05-01 NOTE — TELEPHONE ENCOUNTER
Pt calling states that she has sent [facility] messages re: her bleeding issue. Says this can't be normal to be almost 20 days bleeding.     Would like to speak with nurse

## 2023-05-01 NOTE — TELEPHONE ENCOUNTER
Patient calling about her cycle. Has been on this current cycle for about 20 days. Reports light spotting to light flow. Pain and bleeding precautions discussed. Patient to continue to monitor. See [facility] message. Discussed that it could be related to age and hormones changing. States she will wait for further instructions from Dr. Marialuisa Montano. MyChart routed to provider. No further questions at this time.

## 2023-05-05 ENCOUNTER — OFFICE VISIT (OUTPATIENT)
Dept: OBGYN CLINIC | Facility: CLINIC | Age: 43
End: 2023-05-05
Payer: COMMERCIAL

## 2023-05-05 VITALS
HEART RATE: 82 BPM | BODY MASS INDEX: 26.87 KG/M2 | WEIGHT: 146 LBS | RESPIRATION RATE: 18 BRPM | DIASTOLIC BLOOD PRESSURE: 72 MMHG | SYSTOLIC BLOOD PRESSURE: 110 MMHG | HEIGHT: 61.75 IN

## 2023-05-05 DIAGNOSIS — N93.9 ABNORMAL UTERINE BLEEDING (AUB): Primary | ICD-10-CM

## 2023-05-05 LAB
CONTROL LINE PRESENT WITH A CLEAR BACKGROUND (YES/NO): YES YES/NO
PREGNANCY TEST, URINE: NEGATIVE

## 2023-05-05 PROCEDURE — 3078F DIAST BP <80 MM HG: CPT | Performed by: OBSTETRICS & GYNECOLOGY

## 2023-05-05 PROCEDURE — 3008F BODY MASS INDEX DOCD: CPT | Performed by: OBSTETRICS & GYNECOLOGY

## 2023-05-05 PROCEDURE — 99213 OFFICE O/P EST LOW 20 MIN: CPT | Performed by: OBSTETRICS & GYNECOLOGY

## 2023-05-05 PROCEDURE — 81025 URINE PREGNANCY TEST: CPT | Performed by: OBSTETRICS & GYNECOLOGY

## 2023-05-05 PROCEDURE — 3074F SYST BP LT 130 MM HG: CPT | Performed by: OBSTETRICS & GYNECOLOGY

## 2023-05-05 RX ORDER — MEDROXYPROGESTERONE ACETATE 10 MG/1
10 TABLET ORAL DAILY
Qty: 10 TABLET | Refills: 0 | Status: SHIPPED | OUTPATIENT
Start: 2023-05-05

## 2023-05-05 NOTE — PROGRESS NOTES
Subjective:  38year old [id number]   Patient presents with: Other: Period problem, had been bleeding for 24 day straight     Patient presents complaining of bleeding for past 24 days. Menses started at normal time, very light for three weeks, but now heavy for last two days with double protection and clots. Uses condoms for contraception. Prior to this month, normal monthly menses, normal duration and flow  Had brief episode of abnormal bleeding January 2022 when she started taking spironolactone with normal ultrasound, and menses in December was off due to Covid. Review of Systems:  Pertinent items are noted in the HPI. Objective:  /72   Pulse 82   Resp 18   Ht 61.75\"   Wt 146 lb (66.2 kg)   LMP 01/26/2023 (Exact Date)   Physical Examination:  General appearance: Well dressed, well nourished in no apparent distress  Neurologic/Psychiatric: Alert and oriented to person, place and time, mood normal, affect appropriate    Assessment/Plan:  Abnormal uterine bleeding, first episode- recommend provera 10 mg x 10 days. If no improvement and continued heavy bleeding, recommend therapeutic D&C. Check repeat pelvic ultrasound. Recommend endometrial biopsy if recurrent abnormal uterine bleeding. Discussed long term options including Mirena IUD. Pamphlet given. Diagnoses and all orders for this visit:    Abnormal uterine bleeding (AUB)  -     medroxyPROGESTERone Acetate (PROVERA) 10 MG Oral Tab; Take 1 tablet (10 mg total) by mouth daily.  -     URINE PREGNANCY TEST      Return for Ultrasound.

## 2023-05-09 ENCOUNTER — APPOINTMENT (OUTPATIENT)
Dept: GENERAL RADIOLOGY | Facility: HOSPITAL | Age: 43
End: 2023-05-09
Attending: EMERGENCY MEDICINE
Payer: COMMERCIAL

## 2023-05-09 ENCOUNTER — HOSPITAL ENCOUNTER (EMERGENCY)
Facility: HOSPITAL | Age: 43
Discharge: HOME | End: 2023-05-09
Attending: EMERGENCY MEDICINE
Payer: COMMERCIAL

## 2023-05-09 VITALS
SYSTOLIC BLOOD PRESSURE: 116 MMHG | HEIGHT: 62 IN | RESPIRATION RATE: 15 BRPM | WEIGHT: 145 LBS | BODY MASS INDEX: 26.68 KG/M2 | HEART RATE: 88 BPM | OXYGEN SATURATION: 100 % | DIASTOLIC BLOOD PRESSURE: 85 MMHG | TEMPERATURE: 97 F

## 2023-05-09 DIAGNOSIS — I47.1 SVT (SUPRAVENTRICULAR TACHYCARDIA) (HCC): Primary | ICD-10-CM

## 2023-05-09 LAB
ALBUMIN SERPL-MCNC: 3.8 G/DL (ref 3.4–5)
ALBUMIN/GLOB SERPL: 1.1 {RATIO} (ref 1–2)
ALP LIVER SERPL-CCNC: 75 U/L
ALT SERPL-CCNC: 99 U/L
ANION GAP SERPL CALC-SCNC: 7 MMOL/L (ref 0–18)
APTT PPP: 29.7 SECONDS (ref 23.3–35.6)
AST SERPL-CCNC: 152 U/L (ref 15–37)
ATRIAL RATE: 108 BPM
BASOPHILS # BLD AUTO: 0.05 X10(3) UL (ref 0–0.2)
BASOPHILS NFR BLD AUTO: 0.4 %
BILIRUB SERPL-MCNC: 0.3 MG/DL (ref 0.1–2)
BUN BLD-MCNC: 21 MG/DL (ref 7–18)
CALCIUM BLD-MCNC: 9.4 MG/DL (ref 8.5–10.1)
CHLORIDE SERPL-SCNC: 106 MMOL/L (ref 98–112)
CO2 SERPL-SCNC: 24 MMOL/L (ref 21–32)
CREAT BLD-MCNC: 1.12 MG/DL
EOSINOPHIL # BLD AUTO: 0.11 X10(3) UL (ref 0–0.7)
EOSINOPHIL NFR BLD AUTO: 0.9 %
ERYTHROCYTE [DISTWIDTH] IN BLOOD BY AUTOMATED COUNT: 12.6 %
GFR SERPLBLD BASED ON 1.73 SQ M-ARVRAT: 62 ML/MIN/1.73M2 (ref 60–?)
GLOBULIN PLAS-MCNC: 3.5 G/DL (ref 2.8–4.4)
GLUCOSE BLD-MCNC: 187 MG/DL (ref 70–99)
HCT VFR BLD AUTO: 46.4 %
HGB BLD-MCNC: 15.4 G/DL
IMM GRANULOCYTES # BLD AUTO: 0.05 X10(3) UL (ref 0–1)
IMM GRANULOCYTES NFR BLD: 0.4 %
INR BLD: 1.03 (ref 0.85–1.16)
LYMPHOCYTES # BLD AUTO: 4.97 X10(3) UL (ref 1–4)
LYMPHOCYTES NFR BLD AUTO: 39.4 %
MCH RBC QN AUTO: 30.8 PG (ref 26–34)
MCHC RBC AUTO-ENTMCNC: 33.2 G/DL (ref 31–37)
MCV RBC AUTO: 92.8 FL
MONOCYTES # BLD AUTO: 0.83 X10(3) UL (ref 0.1–1)
MONOCYTES NFR BLD AUTO: 6.6 %
NEUTROPHILS # BLD AUTO: 6.59 X10 (3) UL (ref 1.5–7.7)
NEUTROPHILS # BLD AUTO: 6.59 X10(3) UL (ref 1.5–7.7)
NEUTROPHILS NFR BLD AUTO: 52.3 %
OSMOLALITY SERPL CALC.SUM OF ELEC: 292 MOSM/KG (ref 275–295)
P AXIS: 79 DEGREES
P-R INTERVAL: 142 MS
PLATELET # BLD AUTO: 303 10(3)UL (ref 150–450)
POTASSIUM SERPL-SCNC: 3.3 MMOL/L (ref 3.5–5.1)
PROT SERPL-MCNC: 7.3 G/DL (ref 6.4–8.2)
PROTHROMBIN TIME: 13.5 SECONDS (ref 11.6–14.8)
Q-T INTERVAL: 320 MS
QRS DURATION: 82 MS
QTC CALCULATION (BEZET): 428 MS
R AXIS: 71 DEGREES
RBC # BLD AUTO: 5 X10(6)UL
SODIUM SERPL-SCNC: 137 MMOL/L (ref 136–145)
T AXIS: 33 DEGREES
TROPONIN I HIGH SENSITIVITY: 5 NG/L
VENTRICULAR RATE: 108 BPM
WBC # BLD AUTO: 12.6 X10(3) UL (ref 4–11)

## 2023-05-09 PROCEDURE — 99291 CRITICAL CARE FIRST HOUR: CPT

## 2023-05-09 PROCEDURE — 85025 COMPLETE CBC W/AUTO DIFF WBC: CPT | Performed by: EMERGENCY MEDICINE

## 2023-05-09 PROCEDURE — 85610 PROTHROMBIN TIME: CPT | Performed by: EMERGENCY MEDICINE

## 2023-05-09 PROCEDURE — 84484 ASSAY OF TROPONIN QUANT: CPT | Performed by: EMERGENCY MEDICINE

## 2023-05-09 PROCEDURE — 96374 THER/PROPH/DIAG INJ IV PUSH: CPT

## 2023-05-09 PROCEDURE — 96361 HYDRATE IV INFUSION ADD-ON: CPT

## 2023-05-09 PROCEDURE — 99285 EMERGENCY DEPT VISIT HI MDM: CPT

## 2023-05-09 PROCEDURE — 93010 ELECTROCARDIOGRAM REPORT: CPT

## 2023-05-09 PROCEDURE — 93005 ELECTROCARDIOGRAM TRACING: CPT

## 2023-05-09 PROCEDURE — 85730 THROMBOPLASTIN TIME PARTIAL: CPT | Performed by: EMERGENCY MEDICINE

## 2023-05-09 PROCEDURE — 71045 X-RAY EXAM CHEST 1 VIEW: CPT | Performed by: EMERGENCY MEDICINE

## 2023-05-09 PROCEDURE — 80053 COMPREHEN METABOLIC PANEL: CPT | Performed by: EMERGENCY MEDICINE

## 2023-05-09 RX ORDER — ADENOSINE 3 MG/ML
INJECTION, SOLUTION INTRAVENOUS
Status: COMPLETED
Start: 2023-05-09 | End: 2023-05-09

## 2023-05-09 RX ORDER — ADENOSINE 3 MG/ML
INJECTION, SOLUTION INTRAVENOUS
Status: DISCONTINUED
Start: 2023-05-09 | End: 2023-05-09

## 2023-05-09 RX ORDER — ADENOSINE 3 MG/ML
6 INJECTION, SOLUTION INTRAVENOUS ONCE
Status: COMPLETED | OUTPATIENT
Start: 2023-05-09 | End: 2023-05-09

## 2023-05-09 NOTE — ED QUICK NOTES
Rounding Completed    Plan of Care reviewed. Waiting for Pt disposition. Elimination needs assessed- Pt has been up and ambulatory to and from restroom. Pt resting comfortably on cot. Bed is locked and in lowest position. Call light within reach.

## 2023-05-23 ENCOUNTER — ULTRASOUND ENCOUNTER (OUTPATIENT)
Dept: OBGYN CLINIC | Facility: CLINIC | Age: 43
End: 2023-05-23
Payer: COMMERCIAL

## 2023-05-23 DIAGNOSIS — N93.9 ABNORMAL UTERINE BLEEDING (AUB): Primary | ICD-10-CM

## 2023-05-23 PROCEDURE — 76830 TRANSVAGINAL US NON-OB: CPT | Performed by: OBSTETRICS & GYNECOLOGY

## 2023-05-23 PROCEDURE — 76856 US EXAM PELVIC COMPLETE: CPT | Performed by: OBSTETRICS & GYNECOLOGY

## 2023-05-23 NOTE — PROGRESS NOTES
TC to patient. Patient notified. Normal ultrasound.   Recommend endometrial biopsy if any further abnormal uterine bleeding and offered Mirena IUD preventatively to avoid further problems

## 2023-07-05 ENCOUNTER — HOSPITAL ENCOUNTER (OUTPATIENT)
Dept: INTERVENTIONAL RADIOLOGY/VASCULAR | Facility: HOSPITAL | Age: 43
Discharge: HOME | End: 2023-07-05
Attending: INTERNAL MEDICINE | Admitting: INTERNAL MEDICINE
Payer: COMMERCIAL

## 2023-07-05 VITALS
DIASTOLIC BLOOD PRESSURE: 80 MMHG | RESPIRATION RATE: 15 BRPM | HEART RATE: 76 BPM | TEMPERATURE: 99 F | OXYGEN SATURATION: 96 % | BODY MASS INDEX: 26.68 KG/M2 | SYSTOLIC BLOOD PRESSURE: 106 MMHG | HEIGHT: 62 IN | WEIGHT: 145 LBS

## 2023-07-05 DIAGNOSIS — I47.1 SVT (SUPRAVENTRICULAR TACHYCARDIA) (HCC): ICD-10-CM

## 2023-07-05 LAB
ANION GAP SERPL CALC-SCNC: 3 MMOL/L (ref 0–18)
BUN BLD-MCNC: 11 MG/DL (ref 7–18)
CALCIUM BLD-MCNC: 8.9 MG/DL (ref 8.5–10.1)
CHLORIDE SERPL-SCNC: 109 MMOL/L (ref 98–112)
CO2 SERPL-SCNC: 29 MMOL/L (ref 21–32)
CREAT BLD-MCNC: 0.61 MG/DL
FASTING STATUS PATIENT QL REPORTED: YES
GFR SERPLBLD BASED ON 1.73 SQ M-ARVRAT: 113 ML/MIN/1.73M2 (ref 60–?)
GLUCOSE BLD-MCNC: 90 MG/DL (ref 70–99)
HCG SERPL QL: NEGATIVE
OSMOLALITY SERPL CALC.SUM OF ELEC: 291 MOSM/KG (ref 275–295)
POTASSIUM SERPL-SCNC: 3.5 MMOL/L (ref 3.5–5.1)
SODIUM SERPL-SCNC: 141 MMOL/L (ref 136–145)

## 2023-07-05 PROCEDURE — 4A0234Z MEASUREMENT OF CARDIAC ELECTRICAL ACTIVITY, PERCUTANEOUS APPROACH: ICD-10-PCS | Performed by: INTERNAL MEDICINE

## 2023-07-05 PROCEDURE — 93623 PRGRMD STIMJ&PACG IV RX NFS: CPT | Performed by: INTERNAL MEDICINE

## 2023-07-05 PROCEDURE — 93653 COMPRE EP EVAL TX SVT: CPT | Performed by: INTERNAL MEDICINE

## 2023-07-05 PROCEDURE — 02583ZZ DESTRUCTION OF CONDUCTION MECHANISM, PERCUTANEOUS APPROACH: ICD-10-PCS | Performed by: INTERNAL MEDICINE

## 2023-07-05 PROCEDURE — 4A023FZ MEASUREMENT OF CARDIAC RHYTHM, PERCUTANEOUS APPROACH: ICD-10-PCS | Performed by: INTERNAL MEDICINE

## 2023-07-05 PROCEDURE — 02K83ZZ MAP CONDUCTION MECHANISM, PERCUTANEOUS APPROACH: ICD-10-PCS | Performed by: INTERNAL MEDICINE

## 2023-07-05 PROCEDURE — 80048 BASIC METABOLIC PNL TOTAL CA: CPT | Performed by: INTERNAL MEDICINE

## 2023-07-05 PROCEDURE — 84703 CHORIONIC GONADOTROPIN ASSAY: CPT | Performed by: INTERNAL MEDICINE

## 2023-07-05 PROCEDURE — 99152 MOD SED SAME PHYS/QHP 5/>YRS: CPT | Performed by: INTERNAL MEDICINE

## 2023-07-05 PROCEDURE — 99153 MOD SED SAME PHYS/QHP EA: CPT | Performed by: INTERNAL MEDICINE

## 2023-07-05 RX ORDER — ISOPROTERENOL HYDROCHLORIDE 0.2 MG/ML
INJECTION, SOLUTION INTRAMUSCULAR; INTRAVENOUS
Status: COMPLETED
Start: 2023-07-05 | End: 2023-07-05

## 2023-07-05 RX ORDER — HEPARIN SODIUM 1000 [USP'U]/ML
INJECTION, SOLUTION INTRAVENOUS; SUBCUTANEOUS
Status: COMPLETED
Start: 2023-07-05 | End: 2023-07-05

## 2023-07-05 RX ORDER — LIDOCAINE HYDROCHLORIDE 10 MG/ML
INJECTION, SOLUTION EPIDURAL; INFILTRATION; INTRACAUDAL; PERINEURAL
Status: COMPLETED
Start: 2023-07-05 | End: 2023-07-05

## 2023-07-05 RX ORDER — MIDAZOLAM HYDROCHLORIDE 1 MG/ML
INJECTION INTRAMUSCULAR; INTRAVENOUS
Status: COMPLETED
Start: 2023-07-05 | End: 2023-07-05

## 2023-07-05 RX ORDER — HEPARIN SODIUM 5000 [USP'U]/ML
INJECTION, SOLUTION INTRAVENOUS; SUBCUTANEOUS
Status: COMPLETED
Start: 2023-07-05 | End: 2023-07-05

## 2023-07-05 RX ORDER — SODIUM CHLORIDE 9 MG/ML
INJECTION, SOLUTION INTRAVENOUS
Status: DISCONTINUED | OUTPATIENT
Start: 2023-07-06 | End: 2023-07-05 | Stop reason: HOSPADM

## 2023-07-05 NOTE — PROGRESS NOTES
Pt ambulated in lewis,voided. Rt groin remains c/d/I soft. IV d.cd no questions re discharge instructions. . Pt discharged via wheelchair to  in stable condition

## 2023-07-05 NOTE — DISCHARGE INSTRUCTIONS
HOME CARE INSTRUCTIONS FOLLOWING  CARDIAC ABLATION (RFA) OR ELECTROPHYSIOLOGIC STUDY (EPS)    Activity:  DO NOT drive after the procedure. You may resume driving late the following day according to the  Plan on resting and relaxing tonight and tomorrow. It will be normal to tire easily for the first few days,  depending on the length of your procedure and the amount of sedation you received. Do not lift anything over 10 pounds for the next 24 hours. Avoid sexual activity for the next 24 hours. Avoid repeated stair use and excessive walking for the next 24 hours. Avoid drinking alcohol for the next 24 hours. Resume your normal activity after 48 hours, or as instructed by your physician. What is Normal:  You may feel extra heart beats. If these beats come too often, or you feel an episode of multiple fast  heart beats, notify your physician. The procedure site may appear bruised or discolored. There may be a small amount of drainage on the bandage. There may be mild tenderness to the procedure site when touched. Mild tenderness to the site when touched is common. Special Instructions: The bandage is to remain in place for 24 hours. Keep the bandage clean and dry. Do not submerge  the site for 72 hours (no tub baths or pools). After 24 hours, you must remove the bandage. Wash the procedure site gently with soap and water. (If you choose to wear a bandage for a few days, make sure it remains clean and dry and that it is  changed daily.)  The day after the procedure, you may shower (but no tub baths). When you should NOTIFY YOUR PHYSICIAN:  If you have shortness of breath or a persistent cough. If you have chest pain (angina). If you have persistent pain at the procedure site. If you experience signs of a fever, temperature >101o, chills, infection (redness, swelling, thick yellow  drainage, or a foul odor from procedure site). Other:   You may resume your present diet, unless otherwise specified by your physician. You may resume all of your medications as prescribed, unless otherwise directed by your physician.  A

## 2023-07-05 NOTE — PROCEDURES
Electrophysiology Procedure Note    April Ken Location: Cath Lab   CSN [id number] MRN [id number]   Admission Date 7/5/2023  Operation Date 07/05/23    Attending Physician Pia Camilo MD Operating Physician Pia Camilo MD     Pre-Operative Diagnosis: SVT    Post-Operative Diagnosis: Same as above    Procedure Performed:   1. Comprehensive electrophysiology study. 2. Coronary sinus catheter placement to record left atrial electrograms and pace the left atrium. .    3. Three-dimensional intracardiac mapping. 4. 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 Additional Arrhythmias-none  6. Vascular closure with Vascade  7. IV drug infusion-isoproterenol 2 mcg/min pre and post ablation    Indication: Symptomatic drug refractory SVT    EBL: Minimal    Summary of Case: The patient was brought to the EP lab in a fasting and   nonsedated state after providing informed consent. . The right and left groins were prepped   and draped in a sterile fashion. Sedation: Patient's heart rate blood pressure and oxygen saturations were monitored continuously for the procedure. Patient was sedated with Versed and fentanyl and divided doses to achieve sedation. Total dose of Versed was 4 mg. Total dose of fentanyl was 100 mcg. Sedation time was 13 54-15 45    Access:  Access was achieved with ultrasound guidance  Left femoral vein-none  Right femoral vein x3  7 [place] citizen-decapolar catheter  7 [place] citizen-deflectable quadripolar catheter to the RV apex  8 [place] citizen-ThermoCool ST SF catheter. This was upsized to a small curl VISI go sheath    Electrophysiology Study  Baseline measurements made. Pacing from the HRA/CS/RV/his bundle performed.  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 - With isoproterenol washout we were able to induce AV node reentry with single atrial extrastimuli from the proximal CS at 450/260    Entrainment from the ventricle and overdrive pacing resulted in a VA HV response. In addition the post pacing interval minus tachycardia cycle length was approximately 180 ms  Parahisian pacing showed an AV brooklyn response    Preablation with straight atrial pacing there was crossover. There were jumps and echoes across multiple coupling intervals  TCL -320 to 380 ms depending on whether the patient was on Isopril  Termination-ventricular overdrive pacing. Atrial overdrive pacing. Maneuvers/Evidence -see maneuvers above    Ablation  Irrigated tip catheter was utilized in the slow pathway position. 27 W was used titrating up to 35 W.  Junctional beats were seen      CONCLUSIONS:   1. Sinus node function normal.   2. Atrioventricular node function normal.   3. His-Purkinje function normal.   4.  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 Successful ablation of the slow pathway for typical AVNRT  6. Isopril infusion up to 2 mcg/min  7.   Vascade venous closure      Complications:  None      Plan:    1)Meds: -None  2) Bedrest -2 hours  3) Follow up -1 month              Sabine Hernandez MD    Cardiac Electrophysiololgy  [address] [place] [address]  7/5/2023

## 2023-07-05 NOTE — PROGRESS NOTES
Patient received back from cath lab at [phone]. Right groin site with dressing in place, CDI, soft. Dr Davide Leon at bedside to speak with patients  prior to her arrival back to unit. Patient able to void per bedpan upon arrival. Tolerating sips of water at this time. Report given to Heath Bailon 56. Groin site assessed. VSS.

## 2023-07-24 DIAGNOSIS — F41.9 ANXIETY: ICD-10-CM

## 2023-07-25 RX ORDER — CLONAZEPAM 0.5 MG/1
0.5 TABLET ORAL 2 TIMES DAILY PRN
Qty: 30 TABLET | Refills: 1 | Status: SHIPPED | OUTPATIENT
Start: 2023-07-25

## 2023-07-25 NOTE — TELEPHONE ENCOUNTER
Requested Prescriptions     Pending Prescriptions Disp Refills    CLONAZEPAM 0.5 MG Oral Tab [Pharmacy Med Name: CLONAZEPAM 0.5MG TABLETS] 30 tablet 0     Sig: TAKE 1 TABLET(0.5 MG) BY MOUTH TWICE DAILY AS NEEDED FOR ANXIETY     LOV 1/27/23  RTC none noted  Filled 1/27/23 30 tabs 3 refills   No future appointments.

## 2023-08-16 ENCOUNTER — HOSPITAL ENCOUNTER (OUTPATIENT)
Dept: GENERAL RADIOLOGY | Age: 43
Discharge: HOME | End: 2023-08-16
Attending: NURSE PRACTITIONER
Payer: COMMERCIAL

## 2023-08-16 DIAGNOSIS — J45.51 ASTHMA EXACERBATION, NON-ALLERGIC, SEVERE PERSISTENT: ICD-10-CM

## 2023-08-16 PROCEDURE — 71046 X-RAY EXAM CHEST 2 VIEWS: CPT | Performed by: NURSE PRACTITIONER

## 2023-10-26 DIAGNOSIS — F41.9 ANXIETY: ICD-10-CM

## 2023-10-26 RX ORDER — CLONAZEPAM 0.5 MG/1
0.5 TABLET ORAL 2 TIMES DAILY PRN
Qty: 30 TABLET | Refills: 1 | Status: SHIPPED | OUTPATIENT
Start: 2023-10-26

## 2023-10-26 NOTE — TELEPHONE ENCOUNTER
Requested Prescriptions     Pending Prescriptions Disp Refills    clonazePAM 0.5 MG Oral Tab 30 tablet 1     Sig: Take 1 tablet (0.5 mg total) by mouth 2 (two) times daily as needed.      No protocol    LOV 1/27/23  RTC n/a  Last filled 7/25/23 30 tabs 1 refill  Future Appointments   Date Time Provider Mateusz Brown   12/4/2023  7:30 AM Jacy Herrera MD [facility]

## 2023-12-18 ENCOUNTER — HOSPITAL ENCOUNTER (OUTPATIENT)
Age: 43
Discharge: HOME | End: 2023-12-18
Payer: COMMERCIAL

## 2023-12-18 ENCOUNTER — APPOINTMENT (OUTPATIENT)
Dept: GENERAL RADIOLOGY | Age: 43
End: 2023-12-18
Attending: NURSE PRACTITIONER
Payer: COMMERCIAL

## 2023-12-18 VITALS
OXYGEN SATURATION: 100 % | DIASTOLIC BLOOD PRESSURE: 93 MMHG | HEART RATE: 106 BPM | BODY MASS INDEX: 27.23 KG/M2 | TEMPERATURE: 99 F | RESPIRATION RATE: 20 BRPM | WEIGHT: 148 LBS | SYSTOLIC BLOOD PRESSURE: 158 MMHG | HEIGHT: 62 IN

## 2023-12-18 DIAGNOSIS — J98.01 BRONCHOSPASM: ICD-10-CM

## 2023-12-18 DIAGNOSIS — B34.9 VIRAL SYNDROME: Primary | ICD-10-CM

## 2023-12-18 LAB
POCT INFLUENZA A: NEGATIVE
POCT INFLUENZA B: NEGATIVE

## 2023-12-18 PROCEDURE — 99214 OFFICE O/P EST MOD 30 MIN: CPT

## 2023-12-18 PROCEDURE — 71046 X-RAY EXAM CHEST 2 VIEWS: CPT | Performed by: NURSE PRACTITIONER

## 2023-12-18 PROCEDURE — 94640 AIRWAY INHALATION TREATMENT: CPT

## 2023-12-18 PROCEDURE — 87502 INFLUENZA DNA AMP PROBE: CPT | Performed by: NURSE PRACTITIONER

## 2023-12-18 RX ORDER — CODEINE PHOSPHATE AND GUAIFENESIN 10; 100 MG/5ML; MG/5ML
SOLUTION ORAL NIGHTLY PRN
Qty: 50 ML | Refills: 0 | Status: SHIPPED | OUTPATIENT
Start: 2023-12-18 | End: 2023-12-23

## 2023-12-18 RX ORDER — IPRATROPIUM BROMIDE AND ALBUTEROL SULFATE 2.5; .5 MG/3ML; MG/3ML
3 SOLUTION RESPIRATORY (INHALATION) ONCE
Status: COMPLETED | OUTPATIENT
Start: 2023-12-18 | End: 2023-12-18

## 2023-12-18 RX ORDER — BENZONATATE 100 MG/1
100 CAPSULE ORAL 3 TIMES DAILY PRN
Qty: 30 CAPSULE | Refills: 0 | Status: SHIPPED | OUTPATIENT
Start: 2023-12-18 | End: 2024-01-17

## 2023-12-18 RX ORDER — PREDNISONE 20 MG/1
40 TABLET ORAL DAILY
Qty: 8 TABLET | Refills: 0 | Status: SHIPPED | OUTPATIENT
Start: 2023-12-18 | End: 2023-12-22

## 2023-12-18 RX ORDER — PREDNISONE 20 MG/1
40 TABLET ORAL ONCE
Status: COMPLETED | OUTPATIENT
Start: 2023-12-18 | End: 2023-12-18

## 2023-12-18 NOTE — DISCHARGE INSTRUCTIONS
Take your next prednisone tomorrow. Continue your nebulizers at home. Take Tessalon as directed. If you use the cough syrup do not drive for 6 to 8 hours.

## 2024-01-20 ENCOUNTER — PATIENT MESSAGE (OUTPATIENT)
Dept: INTERNAL MEDICINE CLINIC | Facility: CLINIC | Age: 44
End: 2024-01-20

## 2024-01-22 NOTE — TELEPHONE ENCOUNTER
From: Patricia Mcdonald  To: Patricia Joe  Sent: 1/20/2024 11:02 AM CST  Subject: Lab work    Hi Dr. Joe. Can you re-enter the lab work order into my chart. I do not see it. I was only able to schedule the mammogram. Thank you!

## 2024-02-05 ENCOUNTER — TELEPHONE (OUTPATIENT)
Dept: ORTHOPEDICS CLINIC | Facility: CLINIC | Age: 44
End: 2024-02-05

## 2024-02-05 DIAGNOSIS — M25.562 LEFT KNEE PAIN, UNSPECIFIED CHRONICITY: Primary | ICD-10-CM

## 2024-02-05 NOTE — TELEPHONE ENCOUNTER
Xr's ordered for upcoming appointment    Patient sent my-chart message to specify lateraly  And letting her know to call our office so we can have X-rays ordered

## 2024-02-06 ENCOUNTER — OFFICE VISIT (OUTPATIENT)
Facility: CLINIC | Age: 44
End: 2024-02-06
Payer: COMMERCIAL

## 2024-02-06 ENCOUNTER — HOSPITAL ENCOUNTER (OUTPATIENT)
Dept: GENERAL RADIOLOGY | Age: 44
Discharge: HOME | End: 2024-02-06
Attending: PHYSICIAN ASSISTANT
Payer: COMMERCIAL

## 2024-02-06 VITALS — HEIGHT: 62 IN | BODY MASS INDEX: 27.05 KG/M2 | WEIGHT: 147 LBS

## 2024-02-06 DIAGNOSIS — M25.562 LEFT KNEE PAIN, UNSPECIFIED CHRONICITY: ICD-10-CM

## 2024-02-06 DIAGNOSIS — M25.562 ACUTE PAIN OF LEFT KNEE: Primary | ICD-10-CM

## 2024-02-06 PROCEDURE — 73564 X-RAY EXAM KNEE 4 OR MORE: CPT | Performed by: PHYSICIAN ASSISTANT

## 2024-02-06 PROCEDURE — 99203 OFFICE O/P NEW LOW 30 MIN: CPT | Performed by: PHYSICIAN ASSISTANT

## 2024-02-06 PROCEDURE — 3008F BODY MASS INDEX DOCD: CPT | Performed by: PHYSICIAN ASSISTANT

## 2024-02-06 RX ORDER — MELOXICAM 15 MG/1
15 TABLET ORAL
Qty: 30 TABLET | Refills: 0 | Status: SHIPPED | OUTPATIENT
Start: 2024-02-06

## 2024-02-06 NOTE — H&P
[facility] New Patient Note    CC:   Chief Complaint   Patient presents with    Knee Pain     Left knee pain starting yesterday morning . Patient states pain started when akshat got off the couch with her son she was sitting at a weird angle she notes she didn't hear and popping . With certain movement she gets a shooting pain . When at rest she has a throbbing sensation. She had swelling yesterday . Patient has been using a heating pad patient states throbbing is persistent and more than yesterday.       HPI: This 43 year old female presents today with complaints of left knee pain.  Patient reports that yesterday morning she was standing up from her couch and twisted her left knee feeling a sudden pain.  She has since developed a throbbing sensation along the medial aspect of the knee as well as along the bilateral facets of the patella.  She has not taken any medications but has applied heat and this has helped.  She is able to ambulate without assistive devices.  She reports that the knee had been doing well prior to the injury.  She does have a history of stress fracture to the left knee about 10 years ago.    Past Medical History:   Diagnosis Date    Asthma     Miscarriage     SVT (supraventricular tachycardia) 12/19/2022     Past Surgical History:   Procedure Laterality Date    OTHER  knee    TONSILLECTOMY       Current Outpatient Medications   Medication Sig Dispense Refill    Meloxicam 15 MG Oral Tab Take 1 tablet (15 mg total) by mouth daily with breakfast. Take regularly for 2 weeks, followed by as-needed use afterwards. 30 tablet 0    clonazePAM 0.5 MG Oral Tab Take 1 tablet (0.5 mg total) by mouth 2 (two) times daily as needed. 30 tablet 3    Levalbuterol HCl 0.63 MG/3ML Inhalation Nebu Solbabs USE 1 VIAL VIA NEBULIZER 3 TO 4 TIMES DAILY AS NEEDED      TRELEGY ELLIPTA 200-62.5-25 MCG/ACT Inhalation Aerosol Powder, Breath Activated Inhale 1 puff into the lungs every morning.      albuterol (2.5 MG/3ML)  0.083% Inhalation Nebu Soln Take 3 mL (2.5 mg total) by nebulization 4 (four) times daily as needed.      budesonide 0.5 MG/2ML Inhalation Suspension Take 2 mL (0.5 mg total) by nebulization 2 (two) times daily as needed. Two or three times daily as needed for flares      Albuterol Sulfate  (90 Base) MCG/ACT Inhalation Aero Soln Inhale 2 puffs into the lungs 4 (four) times daily as needed. Pre sports       Allergies   Allergen Reactions    Dander ASTHMA     Cat Dander    Mold ASTHMA    Pollen UNKNOWN    Chlorhexidine RASH     Family History   Problem Relation Age of Onset    Cancer Father         lung    Other (Other) Father         Rhuematoid Arthritis    Cancer Mother         lung    Heart Disorder Mother     Other (RA) Mother      Social History     Occupational History    Not on file   Tobacco Use    Smoking status: Never    Smokeless tobacco: Never   Vaping Use    Vaping Use: Never used   Substance and Sexual Activity    Alcohol use: Yes     Comment: occ    Drug use: No     Comment: rare    Sexual activity: Yes     Partners: Male     Birth control/protection: Condom        ROS:  Comprehensive system review obtained and negative except as mentioned above      Physical Exam:    Ht 5' 2\" (1.575 m)   Wt 147 lb (66.7 kg)   LMP 11/20/2023 (Exact Date)   BMI 26.89 kg/m²   Constitutional: Awake, alert, no distress.  Very pleasant.  Psychological: Appropriate affect.  Respiratory: Unlabored breathing.  Left lower extremity:  Inspection: skin is intact without any redness, deformity, or effusion.   Palpation: Tender palpation along the bilateral patellar facets as well as along the bilateral joint lines.  Range of motion: Knee can fully extend to 0 degrees and flex to approximately 110 degrees.  Knee is stable to valgus and varus stress at 0 and 30 degrees. No laxity with anterior or posterior drawer.   Neuromuscular: Strength is normal and sensation is intact.  Vascular: Extremities are warm and  well-perfused.  Lymph: Unremarkable.      Imaging: Imaging was personally viewed, independently interpreted and radiology report read.  Radiographs left knee obtained today demonstrate well-maintained joint spaces without any significant degenerative changes appreciated.      Assessment/Plan:  Diagnoses and all orders for this visit:    Acute pain of left knee  -     Meloxicam 15 MG Oral Tab; Take 1 tablet (15 mg total) by mouth daily with breakfast. Take regularly for 2 weeks, followed by as-needed use afterwards.      Assessment: 43-year-old female with acute left knee pain    Plan: I discussed with the patient the various potential etiologies for her pain.  We discussed that she exhibits signs of inflammation around the patellofemoral joint and discussed ways to reduce the inflammation including oral NSAIDs, icing, heating.  I sent a prescription of meloxicam to the patient's pharmacy.  I will MyChart message the patient in 2 weeks for an update on her progress.  Could consider physical therapy in the future if symptoms persist.  Her questions were sought and answered satisfactorily.  She is happy with the plan will follow as advised.      Guanaco Landeros PA-C  [facility] Orthopedic [facility]    This note was dictated using Dragon software.  While it was briefly proofread prior to completion, some grammatical, spelling, and word choice errors due to dictation may still occur.

## 2024-02-29 ENCOUNTER — HOSPITAL ENCOUNTER (OUTPATIENT)
Dept: MAMMOGRAPHY | Age: 44
Discharge: HOME | End: 2024-02-29
Attending: INTERNAL MEDICINE
Payer: COMMERCIAL

## 2024-02-29 DIAGNOSIS — Z12.31 SCREENING MAMMOGRAM FOR BREAST CANCER: ICD-10-CM

## 2024-02-29 PROCEDURE — 77063 BREAST TOMOSYNTHESIS BI: CPT | Performed by: INTERNAL MEDICINE

## 2024-02-29 PROCEDURE — 77067 SCR MAMMO BI INCL CAD: CPT | Performed by: INTERNAL MEDICINE

## 2024-03-11 ENCOUNTER — PATIENT MESSAGE (OUTPATIENT)
Dept: INTERNAL MEDICINE CLINIC | Facility: CLINIC | Age: 44
End: 2024-03-11

## 2024-03-11 DIAGNOSIS — R92.30 DENSE BREAST TISSUE ON MAMMOGRAM, UNSPECIFIED TYPE: Primary | ICD-10-CM

## 2024-03-12 NOTE — TELEPHONE ENCOUNTER
From: Patricia Mcdonald  To: Patricia Joe  Sent: 3/11/2024 8:45 PM CDT  Subject: Mammogram     Hi Dr. Joe,  In my mammogram report it states I have dense breast tissue. At what point would I need additional screening such as an ultrasound?

## 2024-03-15 NOTE — PATIENT INSTRUCTIONS
Heart scan  [facility]  [phone]    Blood work    Let me know how you are doing with the clonazepam and we can change back to alprazolam if needed. done

## 2024-03-23 ENCOUNTER — HOSPITAL ENCOUNTER (OUTPATIENT)
Dept: CT IMAGING | Age: 44
Discharge: HOME | End: 2024-03-23
Attending: INTERNAL MEDICINE

## 2024-03-23 DIAGNOSIS — Z13.9 SPECIAL SCREENING: ICD-10-CM

## 2024-04-22 ENCOUNTER — HOSPITAL ENCOUNTER (EMERGENCY)
Age: 44
Discharge: HOME | End: 2024-04-22
Attending: STUDENT IN AN ORGANIZED HEALTH CARE EDUCATION/TRAINING PROGRAM
Payer: COMMERCIAL

## 2024-04-22 VITALS
TEMPERATURE: 99 F | BODY MASS INDEX: 27.23 KG/M2 | WEIGHT: 148 LBS | HEART RATE: 96 BPM | OXYGEN SATURATION: 99 % | SYSTOLIC BLOOD PRESSURE: 137 MMHG | HEIGHT: 62 IN | DIASTOLIC BLOOD PRESSURE: 89 MMHG | RESPIRATION RATE: 18 BRPM

## 2024-04-22 DIAGNOSIS — R03.0 ELEVATED BLOOD PRESSURE READING IN OFFICE WITHOUT DIAGNOSIS OF HYPERTENSION: Primary | ICD-10-CM

## 2024-04-22 LAB
ATRIAL RATE: 97 BPM
P AXIS: 60 DEGREES
P-R INTERVAL: 98 MS
Q-T INTERVAL: 360 MS
QRS DURATION: 86 MS
QTC CALCULATION (BEZET): 457 MS
R AXIS: 80 DEGREES
T AXIS: 38 DEGREES
VENTRICULAR RATE: 97 BPM

## 2024-04-22 PROCEDURE — 99283 EMERGENCY DEPT VISIT LOW MDM: CPT

## 2024-04-22 PROCEDURE — 93005 ELECTROCARDIOGRAM TRACING: CPT

## 2024-04-22 PROCEDURE — 93010 ELECTROCARDIOGRAM REPORT: CPT

## 2024-04-22 RX ORDER — AMOXICILLIN AND CLAVULANATE POTASSIUM 875; 125 MG/1; MG/1
1 TABLET, FILM COATED ORAL 2 TIMES DAILY
COMMUNITY

## 2024-04-22 RX ORDER — PREDNISONE 20 MG/1
40 TABLET ORAL DAILY
COMMUNITY

## 2024-04-22 NOTE — ED PROVIDER NOTES
Patient Seen in: [facility] [place]      History     Chief Complaint   Patient presents with    Hypertension     Stated Complaint: at pmd today, hypertension. last bp at home 160s/120s, not currently on bp medi*    Subjective:   HPI    The patient is a 43-year-old female with a history of asthma presenting after elevated blood pressure readings.  She states she had a blood pressure reading 160/1 20s.  She tells me that she has been dealing with an asthma exacerbation and also sinus infection.  While she has felt pressure in her head it has been related to her sinuses and she has been taking Sudafed and also tells me that her pulmonologist increased her prednisone.  While she was at her pulm's office she had the elevated readings.  She became concerned because she has never had elevated blood pressure.  She went home and checked again and found readings to be high again.  She began to panic and states she took a clonazepam.      Objective:   Past Medical History:    Asthma (HCC)    Miscarriage (HCC)    SVT (supraventricular tachycardia) (HCC)              Past Surgical History:   Procedure Laterality Date    Other  knee    Tonsillectomy                  Social History     Socioeconomic History    Marital status:    Tobacco Use    Smoking status: Never    Smokeless tobacco: Never   Vaping Use    Vaping status: Never Used   Substance and Sexual Activity    Alcohol use: Yes     Comment: occ    Drug use: No     Comment: rare    Sexual activity: Yes     Partners: Male     Birth control/protection: Condom   Other Topics Concern    Caffeine Concern Yes     Comment: 1 cup of coffee daily and occ Diet Coke     Exercise Yes     Comment: 2-3x/week    Seat Belt Yes    Special Diet No    Stress Concern Yes    Weight Concern No              Review of Systems    Positive for stated complaint: at pmd today, hypertension. last bp at home 160s/120s, not currently on bp medi*  Other systems are as noted  in HPI.  Constitutional and vital signs reviewed.      All other systems reviewed and negative except as noted above.    Physical Exam     ED Triage Vitals [04/22/24 1508]   BP (!) 163/95   Pulse 98   Resp 20   Temp 97.9 °F (36.6 °C)   Temp src Temporal   SpO2 98 %   O2 Device None (Room air)       Current:/89   Pulse 96   Temp 99.1 °F (37.3 °C) (Temporal)   Resp 18   Ht 157.5 cm (5' 2\")   Wt 67.1 kg   LMP 04/08/2024 (Approximate)   SpO2 99%   BMI 27.07 kg/m²         Physical Exam  Vitals and nursing note reviewed.   Constitutional:       General: She is not in acute distress.     Appearance: Normal appearance.   HENT:      Head: Normocephalic.      Nose: Nose normal.      Mouth/Throat:      Mouth: Mucous membranes are moist.   Eyes:      Extraocular Movements: Extraocular movements intact.      Pupils: Pupils are equal, round, and reactive to light.   Cardiovascular:      Rate and Rhythm: Normal rate and regular rhythm.      Pulses: Normal pulses.   Pulmonary:      Effort: Pulmonary effort is normal.   Abdominal:      General: Abdomen is flat. Bowel sounds are normal. There is no distension.      Palpations: Abdomen is soft.      Tenderness: There is no abdominal tenderness. There is no right CVA tenderness, left CVA tenderness, guarding or rebound.      Hernia: No hernia is present.   Musculoskeletal:         General: No swelling or tenderness. Normal range of motion.      Cervical back: Normal range of motion.   Skin:     General: Skin is warm and dry.   Neurological:      Mental Status: She is alert and oriented to person, place, and time. Mental status is at baseline.   Psychiatric:         Mood and Affect: Mood normal.               ED Course   Labs Reviewed - No data to display  EKG    Rate, intervals and axes as noted on EKG Report.  Rate: 97  Rhythm: Sinus Rhythm  Reading: There are no ST elevations depressions or T wave inversions, short SD interval but this was present on prior EKG                   MDM        Medical Decision Making  The differential includes the following  Elevated blood pressure reading secondary to medications  Anxiety reaction  Sinus infection    Pertinent comorbidities include  As listed above    Pertinent social history includes  As listed above        ER course  The patient's blood pressure from triage was slightly elevated with a systolic of 160.  When I evaluated the patient her blood pressure was 130s over 80s.  No focal findings on physical exam.  Reported headache that she described is more so related to her sinus infection which she just received Augmentin for.  Unclear if her Sudafed and prednisone could be contributing to her elevated blood pressure readings on top of anxiety but we discussed obtaining labs as well as a CT head which ultimately patient at this time does not want to pursue.  We had a long discussion about red flag symptoms with elevated blood pressure readings and recommendation to keep a close eye on her symptoms.  Patient voiced understanding.  Disposition and Plan     Clinical Impression:  1. Elevated blood pressure reading in office without diagnosis of hypertension         Disposition:  Discharge  4/22/2024  4:23 pm    Follow-up:  Patricia Joe MD  [address] [place] [id number] [phone]    Follow up            Medications Prescribed:  Discharge Medication List as of 4/22/2024  4:34 PM

## 2024-04-22 NOTE — ED INITIAL ASSESSMENT (HPI)
Patient here for elevated blood pressure. States she was seen at urgent care yesterday for asthma, noticed blood pressure was elevated. Monitoring blood pressure at home, noticed readings were elevated. Complains of headache. Alert and oriented 4/4. Denies chest pain.

## 2024-05-20 ENCOUNTER — OFFICE VISIT (OUTPATIENT)
Dept: OBGYN CLINIC | Facility: CLINIC | Age: 44
End: 2024-05-20

## 2024-05-20 VITALS
WEIGHT: 152.19 LBS | SYSTOLIC BLOOD PRESSURE: 117 MMHG | HEART RATE: 78 BPM | DIASTOLIC BLOOD PRESSURE: 72 MMHG | BODY MASS INDEX: 28 KG/M2

## 2024-05-20 DIAGNOSIS — Z01.419 WELL WOMAN EXAM WITH ROUTINE GYNECOLOGICAL EXAM: Primary | ICD-10-CM

## 2024-05-20 DIAGNOSIS — Z12.4 CERVICAL CANCER SCREENING: ICD-10-CM

## 2024-05-20 DIAGNOSIS — N92.6 IRREGULAR MENSES: ICD-10-CM

## 2024-05-20 PROCEDURE — 88175 CYTOPATH C/V AUTO FLUID REDO: CPT | Performed by: NURSE PRACTITIONER

## 2024-05-20 PROCEDURE — 87624 HPV HI-RISK TYP POOLED RSLT: CPT | Performed by: NURSE PRACTITIONER

## 2024-05-20 RX ORDER — HYDROQUINONE
POWDER (GRAM) MISCELLANEOUS
COMMUNITY
Start: 2024-03-14

## 2024-05-20 RX ORDER — MINOXIDIL 2.5 MG/1
TABLET ORAL
COMMUNITY

## 2024-05-20 RX ORDER — IVERMECTIN 10 MG/G
CREAM TOPICAL
COMMUNITY
Start: 2024-03-05

## 2024-05-20 NOTE — PROGRESS NOTES
Here for Routine Annual Exam    Menses have been somewhat less predicable but mostly regular. She can bleed up to 10 days but most are spotting. She has not had any prolonged bleeding like last seen for. Some cycles can be heavier than others then she has 3 day menses that can be light so they vary a lot.    Contraception- condoms.  Doing a breast ultrasound for density but mammogram was normal.      ROS: No Cardiac, Respiratory, GI,  or Neurological symptoms.    PE:  GENERAL: well developed, well nourished, in no apparent distress  SKIN: no rashes, no suspicious lesions  HEENT: normal  NECK: supple; no thyroidmegaly, no adenopathy  LUNGS: clear to auscultation  CARDIOVASCULAR: normal S1, S2, RRR  BREASTS: firm, nontendder, no palpable masses or nodes, no nipple discharge, no skin changes, no axillary adenopathy,    ABDOMEN: Soft, non distended; non tender, no masses  GYNE/: External Genitalia: Normal without lesions or erythema                      Vagina: normal without lesions, scant discharge                      Uterus: mid, mobile, non tender, normal size                     Cervix: no lesions or CMT, friable                     Adnexa: non tender, no masses, normal size  EXTREMITIES:  non tender without edema    A/P:   1. Well woman exam with routine gynecological exam  Regular self breast exams recommended    2. Cervical cancer screening  - ThinPrep PAP with HPV Reflex Request; Future  - ThinPrep PAP with HPV Reflex Request    3. Irregular menses  Advised she call with any concerns for heavy, frequent, pr prolonged menses       Return to clinic 1 year for routine exam, or as needed with any concerns or question

## 2024-05-22 DIAGNOSIS — F41.9 ANXIETY: ICD-10-CM

## 2024-05-23 ENCOUNTER — HOSPITAL ENCOUNTER (OUTPATIENT)
Dept: MAMMOGRAPHY | Facility: HOSPITAL | Age: 44
Discharge: HOME | End: 2024-05-23
Attending: INTERNAL MEDICINE

## 2024-05-23 DIAGNOSIS — R92.30 DENSE BREAST TISSUE ON MAMMOGRAM, UNSPECIFIED TYPE: ICD-10-CM

## 2024-05-23 LAB
.: NORMAL
.: NORMAL

## 2024-05-23 PROCEDURE — 76641 ULTRASOUND BREAST COMPLETE: CPT | Performed by: INTERNAL MEDICINE

## 2024-05-24 DIAGNOSIS — F41.9 ANXIETY: ICD-10-CM

## 2024-05-24 LAB — HPV I/H RISK 1 DNA SPEC QL NAA+PROBE: NEGATIVE

## 2024-05-24 RX ORDER — CLONAZEPAM 0.5 MG/1
0.5 TABLET ORAL 2 TIMES DAILY PRN
Qty: 30 TABLET | Refills: 3 | OUTPATIENT
Start: 2024-05-24

## 2024-05-24 RX ORDER — CLONAZEPAM 0.5 MG/1
0.5 TABLET ORAL 2 TIMES DAILY PRN
Qty: 30 TABLET | Refills: 5 | Status: SHIPPED | OUTPATIENT
Start: 2024-05-24

## 2024-05-24 NOTE — TELEPHONE ENCOUNTER
Requesting    Name from pharmacy: CLONAZEPAM 0.5MG TABLETS         Will file in chart as: CLONAZEPAM 0.5 MG Oral Tab     Possible duplicate: Hover to review recent actions on this medication    Sig: TAKE 1 TABLET(0.5 MG) BY MOUTH TWICE DAILY AS NEEDED    Disp: 30 tablet    Refills: 0 (Pharmacy requested: Not specified)    Start: 5/22/2024    Class: Normal    For: Anxiety    Last ordered: 5 months ago (12/4/2023) by Patricia Joe MD    Last refill: 4/24/2024    Rx #: [id number]    Controlled Substance Medication [id number]/22/2024 09:55 PM    This medication is a controlled substance - forward to provider to refill        LOV: 12/4/2023  RTC: 6 months   Last Relevant Labs: n/a  Filled: 12/4/2023 #30 with 3 refills    No future appointments.

## 2024-11-19 ENCOUNTER — PATIENT MESSAGE (OUTPATIENT)
Dept: OBGYN CLINIC | Facility: CLINIC | Age: 44
End: 2024-11-19

## 2024-11-20 NOTE — TELEPHONE ENCOUNTER
Please see pt message & advise if pt needs office visit, or see PCP for nausea?. Last annual 5/20/24

## 2024-11-20 NOTE — TELEPHONE ENCOUNTER
I am not familiar with that being a common concern with perimenopause. It sounds like more a change in pre- menstrual symptoms.

## 2024-12-03 ENCOUNTER — LAB ENCOUNTER (OUTPATIENT)
Dept: LAB | Age: 44
End: 2024-12-03
Attending: INTERNAL MEDICINE
Payer: COMMERCIAL

## 2024-12-03 DIAGNOSIS — Z00.00 LABORATORY EXAM ORDERED AS PART OF ROUTINE GENERAL MEDICAL EXAMINATION: ICD-10-CM

## 2024-12-03 DIAGNOSIS — E55.9 HYPOVITAMINOSIS D: ICD-10-CM

## 2024-12-03 LAB
ALBUMIN SERPL-MCNC: 4.6 G/DL (ref 3.2–4.8)
ALBUMIN/GLOB SERPL: 1.7 {RATIO} (ref 1–2)
ALP LIVER SERPL-CCNC: 61 U/L
ALT SERPL-CCNC: 14 U/L
ANION GAP SERPL CALC-SCNC: 9 MMOL/L (ref 0–18)
AST SERPL-CCNC: 21 U/L (ref ?–34)
BASOPHILS # BLD AUTO: 0.05 X10(3) UL (ref 0–0.2)
BASOPHILS NFR BLD AUTO: 0.6 %
BILIRUB SERPL-MCNC: 0.7 MG/DL (ref 0.3–1.2)
BUN BLD-MCNC: 9 MG/DL (ref 9–23)
CALCIUM BLD-MCNC: 9.5 MG/DL (ref 8.7–10.4)
CHLORIDE SERPL-SCNC: 106 MMOL/L (ref 98–112)
CHOLEST SERPL-MCNC: 175 MG/DL (ref ?–200)
CO2 SERPL-SCNC: 26 MMOL/L (ref 21–32)
CREAT BLD-MCNC: 0.77 MG/DL
EGFRCR SERPLBLD CKD-EPI 2021: 96 ML/MIN/1.73M2 (ref 60–?)
EOSINOPHIL # BLD AUTO: 0.11 X10(3) UL (ref 0–0.7)
EOSINOPHIL NFR BLD AUTO: 1.3 %
ERYTHROCYTE [DISTWIDTH] IN BLOOD BY AUTOMATED COUNT: 12.7 %
FASTING PATIENT LIPID ANSWER: YES
FASTING STATUS PATIENT QL REPORTED: YES
GLOBULIN PLAS-MCNC: 2.7 G/DL (ref 2–3.5)
GLUCOSE BLD-MCNC: 94 MG/DL (ref 70–99)
HCT VFR BLD AUTO: 45.9 %
HDLC SERPL-MCNC: 83 MG/DL (ref 40–59)
HGB BLD-MCNC: 14.8 G/DL
IMM GRANULOCYTES # BLD AUTO: 0.02 X10(3) UL (ref 0–1)
IMM GRANULOCYTES NFR BLD: 0.2 %
LDLC SERPL CALC-MCNC: 75 MG/DL (ref ?–100)
LYMPHOCYTES # BLD AUTO: 2.09 X10(3) UL (ref 1–4)
LYMPHOCYTES NFR BLD AUTO: 24.6 %
MCH RBC QN AUTO: 30.5 PG (ref 26–34)
MCHC RBC AUTO-ENTMCNC: 32.2 G/DL (ref 31–37)
MCV RBC AUTO: 94.4 FL
MONOCYTES # BLD AUTO: 0.6 X10(3) UL (ref 0.1–1)
MONOCYTES NFR BLD AUTO: 7.1 %
NEUTROPHILS # BLD AUTO: 5.64 X10 (3) UL (ref 1.5–7.7)
NEUTROPHILS # BLD AUTO: 5.64 X10(3) UL (ref 1.5–7.7)
NEUTROPHILS NFR BLD AUTO: 66.2 %
NONHDLC SERPL-MCNC: 92 MG/DL (ref ?–130)
OSMOLALITY SERPL CALC.SUM OF ELEC: 290 MOSM/KG (ref 275–295)
PLATELET # BLD AUTO: 275 10(3)UL (ref 150–450)
POTASSIUM SERPL-SCNC: 4.8 MMOL/L (ref 3.5–5.1)
PROT SERPL-MCNC: 7.3 G/DL (ref 5.7–8.2)
RBC # BLD AUTO: 4.86 X10(6)UL
SODIUM SERPL-SCNC: 141 MMOL/L (ref 136–145)
TRIGL SERPL-MCNC: 95 MG/DL (ref 30–149)
TSI SER-ACNC: 2.76 UIU/ML (ref 0.55–4.78)
VIT D+METAB SERPL-MCNC: 35.3 NG/ML (ref 30–100)
VLDLC SERPL CALC-MCNC: 15 MG/DL (ref 0–30)
WBC # BLD AUTO: 8.5 X10(3) UL (ref 4–11)

## 2024-12-03 PROCEDURE — 82306 VITAMIN D 25 HYDROXY: CPT | Performed by: INTERNAL MEDICINE

## 2024-12-03 PROCEDURE — 80061 LIPID PANEL: CPT | Performed by: INTERNAL MEDICINE

## 2024-12-03 PROCEDURE — 80050 GENERAL HEALTH PANEL: CPT | Performed by: INTERNAL MEDICINE

## 2024-12-10 DIAGNOSIS — F41.9 ANXIETY: ICD-10-CM

## 2024-12-10 NOTE — TELEPHONE ENCOUNTER
Patient comment: Would like a refill until I can be seen at appointment in January     LOV: 12/4/23   RTC: 6 months  Filled: 5/24/24 #30 5 refills  Labs: 12/3/24  Future Appointments   Date Time Provider Department Center   1/22/2025  8:00 AM Patricia Joe MD [facility]

## 2024-12-11 RX ORDER — CLONAZEPAM 0.5 MG/1
0.5 TABLET ORAL 2 TIMES DAILY PRN
Qty: 30 TABLET | Refills: 0 | Status: SHIPPED | OUTPATIENT
Start: 2024-12-11

## 2025-01-20 ENCOUNTER — HOSPITAL ENCOUNTER (OUTPATIENT)
Age: 45
Discharge: HOME | End: 2025-01-20
Payer: COMMERCIAL

## 2025-01-20 ENCOUNTER — APPOINTMENT (OUTPATIENT)
Dept: GENERAL RADIOLOGY | Age: 45
End: 2025-01-20
Attending: NURSE PRACTITIONER
Payer: COMMERCIAL

## 2025-01-20 VITALS
HEIGHT: 62 IN | WEIGHT: 150 LBS | BODY MASS INDEX: 27.6 KG/M2 | HEART RATE: 129 BPM | RESPIRATION RATE: 18 BRPM | DIASTOLIC BLOOD PRESSURE: 84 MMHG | SYSTOLIC BLOOD PRESSURE: 136 MMHG | TEMPERATURE: 98 F | OXYGEN SATURATION: 97 %

## 2025-01-20 DIAGNOSIS — J11.1 INFLUENZA: Primary | ICD-10-CM

## 2025-01-20 DIAGNOSIS — J45.901 ASTHMA WITH ACUTE EXACERBATION, UNSPECIFIED ASTHMA SEVERITY, UNSPECIFIED WHETHER PERSISTENT (HCC): ICD-10-CM

## 2025-01-20 LAB
ATRIAL RATE: 125 BPM
P AXIS: -3 DEGREES
P-R INTERVAL: 128 MS
POCT INFLUENZA A: POSITIVE
POCT INFLUENZA B: NEGATIVE
Q-T INTERVAL: 312 MS
QRS DURATION: 84 MS
QTC CALCULATION (BEZET): 450 MS
R AXIS: -8 DEGREES
SARS-COV-2 RNA RESP QL NAA+PROBE: NOT DETECTED
T AXIS: 55 DEGREES
VENTRICULAR RATE: 125 BPM

## 2025-01-20 PROCEDURE — 99214 OFFICE O/P EST MOD 30 MIN: CPT

## 2025-01-20 PROCEDURE — 87502 INFLUENZA DNA AMP PROBE: CPT | Performed by: NURSE PRACTITIONER

## 2025-01-20 PROCEDURE — 93005 ELECTROCARDIOGRAM TRACING: CPT

## 2025-01-20 PROCEDURE — 93010 ELECTROCARDIOGRAM REPORT: CPT

## 2025-01-20 PROCEDURE — 94640 AIRWAY INHALATION TREATMENT: CPT

## 2025-01-20 PROCEDURE — 71046 X-RAY EXAM CHEST 2 VIEWS: CPT | Performed by: NURSE PRACTITIONER

## 2025-01-20 RX ORDER — ALBUTEROL SULFATE 0.83 MG/ML
2.5 SOLUTION RESPIRATORY (INHALATION) ONCE
Status: COMPLETED | OUTPATIENT
Start: 2025-01-20 | End: 2025-01-20

## 2025-01-20 RX ORDER — OSELTAMIVIR PHOSPHATE 75 MG/1
75 CAPSULE ORAL 2 TIMES DAILY
Qty: 10 CAPSULE | Refills: 0 | Status: SHIPPED | OUTPATIENT
Start: 2025-01-20 | End: 2025-01-25

## 2025-01-20 RX ORDER — CODEINE PHOSPHATE AND GUAIFENESIN 10; 100 MG/5ML; MG/5ML
5 SOLUTION ORAL EVERY 6 HOURS PRN
Qty: 118 ML | Refills: 0 | Status: SHIPPED | OUTPATIENT
Start: 2025-01-20

## 2025-01-20 RX ORDER — PREDNISONE 20 MG/1
40 TABLET ORAL DAILY
Qty: 10 TABLET | Refills: 0 | Status: SHIPPED | OUTPATIENT
Start: 2025-01-20 | End: 2025-01-25

## 2025-01-20 RX ORDER — IPRATROPIUM BROMIDE AND ALBUTEROL SULFATE 2.5; .5 MG/3ML; MG/3ML
3 SOLUTION RESPIRATORY (INHALATION) ONCE
Status: COMPLETED | OUTPATIENT
Start: 2025-01-20 | End: 2025-01-20

## 2025-01-20 NOTE — DISCHARGE INSTRUCTIONS
Influenza adult:     -Oseltamivir (Tamiflu) as prescribed. May cause nausea and vomiting. If this occurs, you may discontinue use and discuss ongoing management with your primary care provider.     -Acetaminophen 650 mg orally every 4 hours as needed for pain.  Do not exceed 4000 mg in 24 hours.     -Ibuprofen 600 mg orally every 6 hours as needed for pain.  Take with food.  Discontinue use and seek medical attention with blood in vomit or stool, coffee-ground vomit, or dark black stool.     -Use medications for minimal duration necessary.     -Over-the-counter nasal saline.  1-2 drops to the affected nostril as needed.       -Cool air humidifier.     -See teaching materials on influenza (packet printed).     -Follow-up with the primary care provider within 3 days for reassessment or sooner if necessary.     -Please monitor for and seek medical attention with fever lasting more than 48 hours, difficulties breathing, increased work of breathing, shortness of breath, chset pain, abdominal pain, persistent vomiting and diarrhea with concerns for dehydration, or any other concerns.

## 2025-01-20 NOTE — ED INITIAL ASSESSMENT (HPI)
Heavy cough and chest heaviness since Saturday. Hx asthma, taking albuterol and budesonide nebs at home without relief, started prednisone 40 mg yesterday, tessalon, mucinex, zyrtec without relief.

## 2025-01-21 NOTE — ED PROVIDER NOTES
Patient Seen in: [facility]      History     Chief Complaint   Patient presents with    Cough     Entered by patient     Stated Complaint: Cough/Asthma    Subjective:   HPI      Patient is a 44-year-old female with asthma, history of SVT, here today with complaints of cough, congestion and bodyaches.  She report that her symptoms started on Saturday.  She states that this feels similar to her asthma however she cannot get rid of the coughing.  She has been taking prednisone, 40 mg daily since yesterday.  She is also taking Tessalon Perles, Mucinex and Zyrtec without relief of her symptoms.  Reports that she did a nebulizer treatment and budesonide prior to arrival here.    Objective:     Past Medical History:    Asthma (HCC)    Miscarriage (HCC)    SVT (supraventricular tachycardia) (HCC)              Past Surgical History:   Procedure Laterality Date    Other  knee    Tonsillectomy                  Social History     Socioeconomic History    Marital status:    Tobacco Use    Smoking status: Never    Smokeless tobacco: Never   Vaping Use    Vaping status: Never Used   Substance and Sexual Activity    Alcohol use: Yes     Comment: occ    Drug use: No     Comment: rare    Sexual activity: Yes     Partners: Male     Birth control/protection: Condom   Other Topics Concern    Caffeine Concern Yes     Comment: 1 cup of coffee daily and occ Diet Coke     Exercise Yes     Comment: 2-3x/week    Seat Belt Yes    Special Diet No    Stress Concern Yes    Weight Concern No              Review of Systems    Positive for stated complaint: Cough/Asthma  Other systems are as noted in HPI.  Constitutional and vital signs reviewed.      All other systems reviewed and negative except as noted above.    Physical Exam     ED Triage Vitals [01/20/25 0921]   BP (!) 141/96   Pulse (!) 139   Resp 16   Temp 98.4 °F (36.9 °C)   Temp src Oral   SpO2 97 %   O2 Device None (Room air)       Current Vitals:   Vital  Signs  BP: 136/84  Pulse: (!) 129  Resp: 18  Temp: 98.4 °F (36.9 °C)  Temp src: Oral    Oxygen Therapy  SpO2: 97 %  O2 Device: None (Room air)        Physical Exam  VS: Vital signs reviewed. O2 saturation within normal limits for this patient     General: Patient is awake and alert, oriented to person, place and time. Not in acute distress.      HEENT: Head is normocephalic atraumatic. Pupils reactive bilaterally.  EOMs intact.  .  No oral pallor. Mucous membranes moist.      Neck: No cervical lymphadenopathy. No stridor. Supple. No meningsmus.      Heart: S1-S2.  Regular rate and rhythm.       Lungs: Diminished lung sounds, no rhonchi, no crackles mild wheezing heard throughout the lung    Lungs reassessed good inspiratory effort. +air entry bilaterally without wheezes, rhonchi, crackles.  No accessory muscle use or tachypnea.       Back: No CVA tenderness.     Extremities: No edema.  Pulses 2+ extremities.   Brisk capillary refill noted.      Skin: Normal skin turgor     CNS: Moves all 4 extremities.  Interacts appropriately.  No tremor.  No gait abnormality          ED Course     Labs Reviewed   POCT FLU TEST - Abnormal; Notable for the following components:       Result Value    POCT INFLUENZA A Positive (*)     All other components within normal limits    Narrative:     This assay is a rapid molecular in vitro test utilizing nucleic acid amplification of influenza A and B viral RNA.   RAPID SARS-COV-2 BY PCR - Normal     EKG    Rate, intervals and axes as noted on EKG Report.  Rate: 125  Rhythm: Sinus Rhythm  Reading: EKG shows a sinus tachycardia at a rate of 125, IL interval 128, QRS of 84.  There is no ST elevation or depression.  There is no ischemic changes.  This is normal EKG                I have personally  reviewed available prior medical records for any recent pertinent discharge summaries/testing. Patient/family updated on results and plan, a verbalized understanding and agreement with the plan.  I  explained to the patient that emergent conditions may arise and to go to the ER for new, worsening or any persistent conditions. I've explained the importance of taking all medicatons as prescribed, follow up, and return precuations,  All questions answered.    Please note that this report has been produced using speech recognition software and may contain errors related to that system including, but not limited to, errors in grammar, punctuation, and spelling, as well as words and phrases that possibly may have been recognized inappropriately.  If there are any questions or concerns, contact the dictating provider for clarification.       MDM      Patient presents with flulike symptoms.  Positive fevers, body aches, sinus congestion, cough and malaise.  Positive sick contacts at home.  No respiratory distress, oxygen saturation within normal limits for the patient. Chest x-ray negative for evidence of pneumonia. Patient is febrile but is nontoxic and does not meet SIRS criteria.  Tolerating oral intake, does not appear clinically dehydrated. Influenza test is positive. Not immunocompromised. No underlying pulmonary diseases.  Tamiflu ordered.  Pt was dc'd home after two neb treatments and much improvement.  Her heart rate although still elevated, is consist guy with asthma and multiple nebs..  Recommend follow-up with PCP as needed. Counseled on supportive care. Return to ED precautions discussed with the patient          Medical Decision Making      Disposition and Plan     Clinical Impression:  1. Influenza    2. Asthma with acute exacerbation, unspecified asthma severity, unspecified whether persistent (HCC)         Disposition:  Discharge  1/20/2025 11:05 am    Follow-up:  Patricia Joe MD  [address] [place] [id number] [phone]                Medications Prescribed:  Discharge Medication List as of 1/20/2025 11:11 AM        START taking these medications    Details    guaiFENesin-codeine 100-10 MG/5ML Oral Solution Take 5 mL by mouth every 6 (six) hours as needed for cough., Normal, Disp-118 mL, R-0      !! predniSONE 20 MG Oral Tab Take 2 tablets (40 mg total) by mouth daily for 5 days., Normal, Disp-10 tablet, R-0      oseltamivir (TAMIFLU) 75 MG Oral Cap Take 1 capsule (75 mg total) by mouth 2 (two) times daily for 5 days., Normal, Disp-10 capsule, R-0       !! - Potential duplicate medications found. Please discuss with provider.              Supplementary Documentation:

## 2025-01-22 ENCOUNTER — OFFICE VISIT (OUTPATIENT)
Dept: INTERNAL MEDICINE CLINIC | Facility: CLINIC | Age: 45
End: 2025-01-22
Payer: COMMERCIAL

## 2025-01-22 VITALS
OXYGEN SATURATION: 98 % | HEIGHT: 62 IN | WEIGHT: 152.19 LBS | BODY MASS INDEX: 28.01 KG/M2 | TEMPERATURE: 98 F | SYSTOLIC BLOOD PRESSURE: 122 MMHG | HEART RATE: 91 BPM | RESPIRATION RATE: 16 BRPM | DIASTOLIC BLOOD PRESSURE: 74 MMHG

## 2025-01-22 DIAGNOSIS — J10.1 INFLUENZA A: ICD-10-CM

## 2025-01-22 DIAGNOSIS — N92.6 IRREGULAR MENSTRUAL BLEEDING: ICD-10-CM

## 2025-01-22 DIAGNOSIS — F41.9 ANXIETY: ICD-10-CM

## 2025-01-22 DIAGNOSIS — E55.9 HYPOVITAMINOSIS D: ICD-10-CM

## 2025-01-22 DIAGNOSIS — Z00.00 LABORATORY EXAM ORDERED AS PART OF ROUTINE GENERAL MEDICAL EXAMINATION: ICD-10-CM

## 2025-01-22 DIAGNOSIS — Z12.31 SCREENING MAMMOGRAM FOR BREAST CANCER: ICD-10-CM

## 2025-01-22 DIAGNOSIS — Z00.00 ENCOUNTER FOR ROUTINE ADULT MEDICAL EXAMINATION: Primary | ICD-10-CM

## 2025-01-22 PROCEDURE — 3078F DIAST BP <80 MM HG: CPT | Performed by: INTERNAL MEDICINE

## 2025-01-22 PROCEDURE — 99396 PREV VISIT EST AGE 40-64: CPT | Performed by: INTERNAL MEDICINE

## 2025-01-22 PROCEDURE — 3074F SYST BP LT 130 MM HG: CPT | Performed by: INTERNAL MEDICINE

## 2025-01-22 PROCEDURE — 3008F BODY MASS INDEX DOCD: CPT | Performed by: INTERNAL MEDICINE

## 2025-01-22 RX ORDER — CLONAZEPAM 0.5 MG/1
0.5 TABLET ORAL 2 TIMES DAILY PRN
Qty: 30 TABLET | Refills: 3 | Status: SHIPPED | OUTPATIENT
Start: 2025-01-22

## 2025-01-22 NOTE — PROGRESS NOTES
[facility] Internal Medicine Office Note  Chief Complaint:   Chief Complaint   Patient presents with    Follow - Up       HPI:   This is a 44 year old female coming in for physical  HPI  Symptoms started on Saturday - influenza A positive  Cough, no fever. Asthma symptoms worsened and she went to urgent care. Prescribed prednisone and tamiflu. She has been using nebulizer.   She received flu shot   Started prednisone on Sunday. She has an appointment with her pulm doctor tomorrow to discuss prednisone taper     She takes clonazepam several times a week and sometimes she doesn't take it for a week.     Up to date with pap, mammo and colonoscopy     Patient Active Problem List   Diagnosis    Pelvic pain    Asthma (HCC)    Hypokalemia    Leukocytosis    Hyperglycemia    SVT (supraventricular tachycardia) (HCC)    Elevated troponin    Demand ischemia of myocardium (HCC)    Chest pain    COVID-19     Past Surgical History:   Procedure Laterality Date    Other  knee    Tonsillectomy       Family History   Problem Relation Age of Onset    Cancer Father         lung    Other (Other) Father         Rhuematoid Arthritis    Cancer Mother         lung    Heart Disorder Mother     Other (RA) Mother         I reviewed her's Past Medical History, Past Surgical History, Family History and   Social History updated shows  Social History     Socioeconomic History    Marital status:    Tobacco Use    Smoking status: Never    Smokeless tobacco: Never   Vaping Use    Vaping status: Never Used   Substance and Sexual Activity    Alcohol use: Yes     Comment: occ    Drug use: No     Comment: rare    Sexual activity: Yes     Partners: Male     Birth control/protection: Condom   Other Topics Concern    Caffeine Concern Yes     Comment: 1 cup of coffee daily and occ Diet Coke     Exercise Yes     Comment: 2-3x/week    Seat Belt Yes    Special Diet No    Stress Concern Yes    Weight Concern No     Social Drivers of Health      Food Insecurity: No Food Insecurity (1/22/2025)    NCSS - Food Insecurity     Worried About Running Out of Food in the Last Year: No     Ran Out of Food in the Last Year: No   Transportation Needs: No Transportation Needs (1/22/2025)    NCSS - Transportation     Lack of Transportation: No   Housing Stability: Not At Risk (1/22/2025)    NCSS - Housing/Utilities     Has Housing: Yes     Worried About Losing Housing: No     Unable to Get Utilities: No     Allergies:  Allergies[1]  Current Outpatient Medications   Medication Sig Dispense Refill    clonazePAM 0.5 MG Oral Tab Take 1 tablet (0.5 mg total) by mouth 2 (two) times daily as needed. 30 tablet 3    guaiFENesin-codeine 100-10 MG/5ML Oral Solution Take 5 mL by mouth every 6 (six) hours as needed for cough. 118 mL 0    oseltamivir (TAMIFLU) 75 MG Oral Cap Take 1 capsule (75 mg total) by mouth 2 (two) times daily for 5 days. 10 capsule 0    Hydroquinone Does not apply Powder Apply every other day to dark areas only every other day only for 2 months, take a break for 1 month.      Ivermectin 1 % External Cream Apply a thin layer, once daily, to affected areas on face.      minoxidil 2.5 MG Oral Tab TAKE 1/4 TABLET BY MOUTH DAILY FOR HAIRLOSS      predniSONE 20 MG Oral Tab Take 2 tablets (40 mg total) by mouth daily.      Levalbuterol HCl 0.63 MG/3ML Inhalation Nebu Soln USE 1 VIAL VIA NEBULIZER 3 TO 4 TIMES DAILY AS NEEDED      budesonide 0.5 MG/2ML Inhalation Suspension Take 2 mL (0.5 mg total) by nebulization 2 (two) times daily as needed. Two or three times daily as needed for flares      predniSONE 20 MG Oral Tab Take 2 tablets (40 mg total) by mouth daily for 5 days. (Patient not taking: Reported on 1/22/2025) 10 tablet 0    Meloxicam 15 MG Oral Tab Take 1 tablet (15 mg total) by mouth daily with breakfast. Take regularly for 2 weeks, followed by as-needed use afterwards. (Patient not taking: Reported on 1/22/2025) 30 tablet 0    albuterol (2.5 MG/3ML)  0.083% Inhalation Nebu Soln Take 3 mL (2.5 mg total) by nebulization 4 (four) times daily as needed. (Patient not taking: Reported on 1/22/2025)           REVIEW OF SYSTEMS:   Review of Systems   Constitutional:  Negative for fever.   HENT:  Negative for congestion.    Eyes:  Negative for visual disturbance.   Respiratory:  Negative for shortness of breath.    Cardiovascular:  Negative for chest pain.   Gastrointestinal:  Negative for constipation.   Genitourinary:  Negative for dysuria.   Neurological: Negative.    Hematological: Negative.    Psychiatric/Behavioral: Negative.          EXAM:   /74 (BP Location: Left arm, Patient Position: Sitting, Cuff Size: adult)   Pulse 91   Temp 98 °F (36.7 °C) (Temporal)   Resp 16   Ht 5' 2\" (1.575 m)   Wt 152 lb 3.2 oz (69 kg)   LMP 12/28/2024 (Exact Date)   SpO2 98%   BMI 27.84 kg/m²  Estimated body mass index is 27.84 kg/m² as calculated from the following:    Height as of this encounter: 5' 2\" (1.575 m).    Weight as of this encounter: 152 lb 3.2 oz (69 kg).   Vital signs reviewed. Appears stated age, well groomed.  Physical Exam  Vitals reviewed.   Constitutional:       General: She is not in acute distress.     Appearance: She is well-developed.   HENT:      Head: Normocephalic and atraumatic.      Right Ear: Tympanic membrane normal.      Left Ear: Tympanic membrane normal.      Mouth/Throat:      Pharynx: Oropharynx is clear. No posterior oropharyngeal erythema.   Eyes:      Conjunctiva/sclera: Conjunctivae normal.   Cardiovascular:      Rate and Rhythm: Normal rate and regular rhythm.      Heart sounds: Normal heart sounds.   Pulmonary:      Effort: Pulmonary effort is normal.      Breath sounds: Normal breath sounds.   Musculoskeletal:      Cervical back: Neck supple.      Right lower leg: No edema.      Left lower leg: No edema.   Skin:     General: Skin is warm and dry.   Neurological:      General: No focal deficit present.      Mental Status: She is  alert.   Psychiatric:         Mood and Affect: Mood normal.          ASSESSMENT AND PLAN:   Patricia Martinez is a 44 year old female with  1. Encounter for routine adult medical examination    2. Anxiety    3. Screening mammogram for breast cancer    4. Laboratory exam ordered as part of routine general medical examination    5. Hypovitaminosis D    6. Influenza A    7. Irregular menstrual bleeding          The plan is as follows  Patricia was seen today for follow - up.    Diagnoses and all orders for this visit:    Encounter for routine adult medical examination  -Discussed routine blood work results from December.  Normal thyroid.  Cholesterol at goal.  Iron is deficient but no anemia  -Mammogram due in March  -Colonoscopy due in 2030  -Pap smear due in 2029; per gyne    Anxiety -continue clonazepam as needed and take sparingly.  She takes for work-related stress and stressors related to her daughter.  She does not feel she needs daily medication at this point such as SSRI which has been discussed in the past  -     clonazePAM 0.5 MG Oral Tab; Take 1 tablet (0.5 mg total) by mouth 2 (two) times daily as needed.    Screening mammogram for breast cancer  -     [facility] DOROTEO 2D+3D SCREENING BILAT (CPT=77067/01318); Future    Laboratory exam ordered as part of routine general medical examination  -     Lipid Panel; Future  -     TSH W Reflex To Free T4; Future  -     CBC With Differential With Platelet; Future  -     Comp Metabolic Panel (14); Future    Hypovitaminosis D -continue over-the-counter vitamin D a few times a week for maintenance.  Check with next labs  -     Vitamin D; Future    Influenza A -symptoms improving.  She has been on Tamiflu for past 3 days.  Her asthma symptoms flared since flu symptoms started.  She has an appointment with her pulmonologist this week to discuss duration of prednisone.  Breathing is much improved.  Continue Tamiflu, prednisone, and albuterol as needed.  Recommend off of work  until Saturday    Irregular menstrual bleeding -recommend following up with her gynecologist to discuss midcycle menstrual bleeding.        Orders Placed This Encounter   Procedures    Lipid Panel    TSH W Reflex To Free T4    CBC With Differential With Platelet    Comp Metabolic Panel (14)    Vitamin D       Meds & Refills for this Visit:  Requested Prescriptions     Signed Prescriptions Disp Refills    clonazePAM 0.5 MG Oral Tab 30 tablet 3     Sig: Take 1 tablet (0.5 mg total) by mouth 2 (two) times daily as needed.       Imaging & Consults:  [facility] DOROTEO 2D+3D SCREENING BILAT (CPT=77067/16355)    Health Maintenance Due   Topic Date Due    Pneumococcal Vaccine: Birth to 50yrs (1 of 2 - PCV) Never done    Asthma Control Test  08/14/2019    Annual Physical  01/27/2024    COVID-19 Vaccine (5 - 2024-25 season) 09/01/2024    Mammogram  02/28/2025     Patient/Caregiver Education: Patient/Caregiver Education: There are no barriers to learning. Medical education done. Outcome: Patient verbalizes understanding. Patient is notified to call with any questions, complications, allergies, or worsening or changing symptoms.  Patient is to call with any side effects or complications from the treatments as a result of today.     Patricia Joe MD         [1]   Allergies  Allergen Reactions    Dander ASTHMA     Cat Dander    Mold ASTHMA    Pollen UNKNOWN    Chlorhexidine RASH

## 2025-01-22 NOTE — PATIENT INSTRUCTIONS
Call to schedule appointment with gynecology to evaluate spotting/abnormal period    Mammogram due March 1 or later     Blood work ordered for Dec 2025

## 2025-03-05 PROBLEM — U07.1 COVID-19: Status: RESOLVED | Noted: 2022-12-23 | Resolved: 2025-03-05

## 2025-03-05 PROBLEM — D72.829 LEUKOCYTOSIS: Status: RESOLVED | Noted: 2022-12-20 | Resolved: 2025-03-05

## 2025-03-05 PROBLEM — R07.9 CHEST PAIN: Status: RESOLVED | Noted: 2022-12-20 | Resolved: 2025-03-05

## 2025-03-05 PROBLEM — R73.9 HYPERGLYCEMIA: Status: RESOLVED | Noted: 2022-12-20 | Resolved: 2025-03-05

## 2025-03-05 PROBLEM — E87.6 HYPOKALEMIA: Status: RESOLVED | Noted: 2022-12-20 | Resolved: 2025-03-05

## 2025-03-06 ENCOUNTER — OFFICE VISIT (OUTPATIENT)
Dept: OBGYN CLINIC | Facility: CLINIC | Age: 45
End: 2025-03-06
Payer: COMMERCIAL

## 2025-03-06 VITALS
DIASTOLIC BLOOD PRESSURE: 70 MMHG | SYSTOLIC BLOOD PRESSURE: 114 MMHG | BODY MASS INDEX: 29 KG/M2 | HEART RATE: 95 BPM | WEIGHT: 155.81 LBS

## 2025-03-06 DIAGNOSIS — N93.9 ABNORMAL UTERINE BLEEDING (AUB): Primary | ICD-10-CM

## 2025-03-06 PROCEDURE — 3078F DIAST BP <80 MM HG: CPT | Performed by: OBSTETRICS & GYNECOLOGY

## 2025-03-06 PROCEDURE — 99213 OFFICE O/P EST LOW 20 MIN: CPT | Performed by: OBSTETRICS & GYNECOLOGY

## 2025-03-06 PROCEDURE — 3074F SYST BP LT 130 MM HG: CPT | Performed by: OBSTETRICS & GYNECOLOGY

## 2025-03-06 RX ORDER — BUDESONIDE AND FORMOTEROL FUMARATE DIHYDRATE 160; 4.5 UG/1; UG/1
2 AEROSOL RESPIRATORY (INHALATION) 2 TIMES DAILY
COMMUNITY
Start: 2025-02-13

## 2025-03-06 NOTE — PROGRESS NOTES
Subjective:  44 year old    Chief Complaint   Patient presents with    Other     Spotting in between menstruals     Patient complains of intermenstrual spotting, up to 7 days midcycle.  Menses approximately monthly but varying 21-40 days, normal flow.  Had one bad menstrual cycle problem 2023 but resumed normal menses until just recently.  US 2023 showed lobulated uterus    Review of Systems:  Pertinent items are noted in the HPI.    Objective:  /70   Pulse 95   Wt 155 lb 12.8 oz (70.7 kg)   LMP 2025 (Exact Date)     Physical Examination:  General appearance: Well dressed, well nourished in no apparent distress  Neurologic/Psychiatric: Alert and oriented to person, place and time, mood normal, affect appropriate  Abdomen: Soft, non-tender, non-distended, no masses, no hepatosplenomegaly, no hernias, no inguinal lymphadenopathy  Pelvic:    External genitalia- Normal, Bartholin's, urethra, skeins glands normal   Vagina- No vaginal lesions, no discharge   Cervix- No lesions, long/closed, no cervical motion tenderness   Uterus- Normal sized, anteverted, non-tender, no masses   Adnexa-  Non-tender, no masses    Assessment/Plan:  AUB with intermenstrual spotting.  Check repeat pelvic ultrasound and will need either office EMB or hysteroscopy    Recommend hysteroscopy, D&C, and possible endometrial polypectomy/myomectomy. Risks, benefits and potential complications reviewed, including anesthesia, infection with need for intravenous antibiotics, bleeding with need for blood transfusion, scarring, uterine perforation with need for hospitalization or additional surgery and fluid overload/electrolyte imbalances.  All questions answered and patient agrees to the  proposed surgery without reservation.    ACOG pamphlets on above procedures given.     Diagnoses and all orders for this visit:    Abnormal uterine bleeding (AUB)  -     US PELVIS W EV (CPT=76856/56287); Future

## 2025-03-12 ENCOUNTER — APPOINTMENT (OUTPATIENT)
Dept: MAMMOGRAPHY | Age: 45
End: 2025-03-12
Attending: INTERNAL MEDICINE
Payer: COMMERCIAL

## 2025-03-12 ENCOUNTER — HOSPITAL ENCOUNTER (OUTPATIENT)
Dept: ULTRASOUND IMAGING | Age: 45
Discharge: HOME | End: 2025-03-12
Attending: OBSTETRICS & GYNECOLOGY
Payer: COMMERCIAL

## 2025-03-12 DIAGNOSIS — N93.9 ABNORMAL UTERINE BLEEDING (AUB): ICD-10-CM

## 2025-03-12 PROCEDURE — 76830 TRANSVAGINAL US NON-OB: CPT | Performed by: OBSTETRICS & GYNECOLOGY

## 2025-03-12 PROCEDURE — 76856 US EXAM PELVIC COMPLETE: CPT | Performed by: OBSTETRICS & GYNECOLOGY

## 2025-03-13 NOTE — PROGRESS NOTES
One small fibroid, possible adenomyosis, likely benign cysts in the ovaries.  Will need 2 mos follow up for the right ovarian cyst.  Recommend return to office for endometrial biopsy.

## 2025-03-14 ENCOUNTER — PATIENT MESSAGE (OUTPATIENT)
Dept: OBGYN CLINIC | Facility: CLINIC | Age: 45
End: 2025-03-14

## 2025-03-20 ENCOUNTER — TELEPHONE (OUTPATIENT)
Dept: OBGYN CLINIC | Facility: CLINIC | Age: 45
End: 2025-03-20

## 2025-03-20 NOTE — TELEPHONE ENCOUNTER
Per chart review- US order was pended and routed to provider for signature.  US order will be available in [employer]hart once signed.

## 2025-03-20 NOTE — TELEPHONE ENCOUNTER
Called pt and scheduled procedure   Future Appointments   Date Time Provider Department Center   3/25/2025  9:00 AM [facility] [address]   4/7/2025  2:15 PM Yang Hernandez MD [facility] [address]     Pt stated if US order can be placed so she can schedule that appointment. Please advise, thank you.

## 2025-04-02 ENCOUNTER — PATIENT MESSAGE (OUTPATIENT)
Dept: OBGYN CLINIC | Facility: CLINIC | Age: 45
End: 2025-04-02

## 2025-04-04 ENCOUNTER — TELEPHONE (OUTPATIENT)
Dept: OBGYN CLINIC | Facility: CLINIC | Age: 45
End: 2025-04-04

## 2025-04-04 NOTE — TELEPHONE ENCOUNTER
Advised on taking pain medication 1 hour before procedure, if patient desires.  Patient verbalized understanding.

## 2025-04-04 NOTE — TELEPHONE ENCOUNTER
Pt has emb procedure on Monday wants to know what medication to take before procedure(she did not take notes when they called last time) . She can be contacted on her cell num . If unable to reach , she said you can leave voice mail or [employer] message , she has sent [employer] message regarding the same on 04/02.

## 2025-04-07 ENCOUNTER — OFFICE VISIT (OUTPATIENT)
Dept: OBGYN CLINIC | Facility: CLINIC | Age: 45
End: 2025-04-07
Payer: COMMERCIAL

## 2025-04-07 ENCOUNTER — HOSPITAL ENCOUNTER (OUTPATIENT)
Dept: MAMMOGRAPHY | Age: 45
Discharge: HOME | End: 2025-04-07
Attending: INTERNAL MEDICINE
Payer: COMMERCIAL

## 2025-04-07 VITALS
DIASTOLIC BLOOD PRESSURE: 74 MMHG | BODY MASS INDEX: 28.94 KG/M2 | HEIGHT: 62 IN | WEIGHT: 157.25 LBS | HEART RATE: 101 BPM | SYSTOLIC BLOOD PRESSURE: 126 MMHG

## 2025-04-07 DIAGNOSIS — Z01.812 PRE-PROCEDURAL LABORATORY EXAMINATION: ICD-10-CM

## 2025-04-07 DIAGNOSIS — N93.9 ABNORMAL UTERINE BLEEDING (AUB): Primary | ICD-10-CM

## 2025-04-07 DIAGNOSIS — N83.201 RIGHT OVARIAN CYST: ICD-10-CM

## 2025-04-07 DIAGNOSIS — Z12.31 SCREENING MAMMOGRAM FOR BREAST CANCER: ICD-10-CM

## 2025-04-07 DIAGNOSIS — D25.9 UTERINE LEIOMYOMA, UNSPECIFIED LOCATION: ICD-10-CM

## 2025-04-07 LAB
CONTROL LINE PRESENT WITH A CLEAR BACKGROUND (YES/NO): YES YES/NO
KIT LOT #: NORMAL NUMERIC
PREGNANCY TEST, URINE: NEGATIVE

## 2025-04-07 PROCEDURE — 88305 TISSUE EXAM BY PATHOLOGIST: CPT | Performed by: OBSTETRICS & GYNECOLOGY

## 2025-04-07 PROCEDURE — 77067 SCR MAMMO BI INCL CAD: CPT | Performed by: INTERNAL MEDICINE

## 2025-04-07 PROCEDURE — 77063 BREAST TOMOSYNTHESIS BI: CPT | Performed by: INTERNAL MEDICINE

## 2025-04-07 RX ORDER — MULTIVITAMIN
1 TABLET ORAL DAILY
COMMUNITY

## 2025-04-07 RX ORDER — KETOCONAZOLE 20 MG/ML
SHAMPOO, SUSPENSION TOPICAL
COMMUNITY
Start: 2025-02-13

## 2025-04-07 RX ORDER — FLUTICASONE FUROATE, UMECLIDINIUM BROMIDE AND VILANTEROL TRIFENATATE 200; 62.5; 25 UG/1; UG/1; UG/1
1 POWDER RESPIRATORY (INHALATION) DAILY
COMMUNITY
Start: 2025-03-04

## 2025-04-07 NOTE — PROCEDURES
Endometrial Biopsy Procedure Note    Indication and Diagnosis: AUB, uterine fibroid    Procedure Details    Urine pregnancy test negative.  The risks (including infection, bleeding, pain, and uterine perforation) and benefits of the procedure were explained to the patient and informed consent was obtained.    The patient was placed in the dorsal lithotomy position.  Bimanual exam showed the uterus to be in the anteverted position.  The cervix was prepped with betadine solution.  2% Lidocaine jellly was applied to the anterior cervix and a tenaculum placed.    Using sterile technique, endometrial biopsy was performed using the pipelle catheter without complications.  The uterus sounded to 6 cm's.  A small amount of tissue was obtained and sent to pathology for examination.    Condition:  Stable    Complications:  None    Plan:  The patient was advised to call for any fever or for prolonged or severe pain or bleeding. She was advised to use ibuprofen as needed for mild to moderate pain. She was advised to avoid vaginal intercourse for 48 hours or until the bleeding has completely stopped.

## 2025-04-21 ENCOUNTER — PATIENT MESSAGE (OUTPATIENT)
Dept: INTERNAL MEDICINE CLINIC | Facility: CLINIC | Age: 45
End: 2025-04-21

## 2025-04-21 ENCOUNTER — HOSPITAL ENCOUNTER (OUTPATIENT)
Dept: MAMMOGRAPHY | Age: 45
Discharge: HOME | End: 2025-04-21
Attending: INTERNAL MEDICINE
Payer: COMMERCIAL

## 2025-04-21 DIAGNOSIS — R92.2 INCONCLUSIVE MAMMOGRAM: ICD-10-CM

## 2025-04-21 DIAGNOSIS — R92.30 DENSE BREAST: Primary | ICD-10-CM

## 2025-04-21 PROCEDURE — 77065 DX MAMMO INCL CAD UNI: CPT | Performed by: INTERNAL MEDICINE

## 2025-04-21 PROCEDURE — 77061 BREAST TOMOSYNTHESIS UNI: CPT | Performed by: INTERNAL MEDICINE

## 2025-05-07 ENCOUNTER — HOSPITAL ENCOUNTER (OUTPATIENT)
Dept: ULTRASOUND IMAGING | Age: 45
Discharge: HOME | End: 2025-05-07
Attending: OBSTETRICS & GYNECOLOGY
Payer: COMMERCIAL

## 2025-05-07 DIAGNOSIS — N83.201 RIGHT OVARIAN CYST: ICD-10-CM

## 2025-05-07 PROCEDURE — 76856 US EXAM PELVIC COMPLETE: CPT | Performed by: OBSTETRICS & GYNECOLOGY

## 2025-05-07 PROCEDURE — 76830 TRANSVAGINAL US NON-OB: CPT | Performed by: OBSTETRICS & GYNECOLOGY

## 2025-06-02 DIAGNOSIS — F41.9 ANXIETY: ICD-10-CM

## 2025-06-02 RX ORDER — CLONAZEPAM 0.5 MG/1
0.5 TABLET ORAL 2 TIMES DAILY PRN
Qty: 30 TABLET | Refills: 1 | Status: SHIPPED | OUTPATIENT
Start: 2025-06-02

## 2025-07-15 ENCOUNTER — OFFICE VISIT (OUTPATIENT)
Facility: CLINIC | Age: 45
End: 2025-07-15
Payer: COMMERCIAL

## 2025-07-15 DIAGNOSIS — M22.2X2 PATELLOFEMORAL PAIN SYNDROME OF LEFT KNEE: Primary | ICD-10-CM

## 2025-07-15 PROCEDURE — 99213 OFFICE O/P EST LOW 20 MIN: CPT | Performed by: PHYSICIAN ASSISTANT

## 2025-07-15 PROCEDURE — 20610 DRAIN/INJ JOINT/BURSA W/O US: CPT | Performed by: PHYSICIAN ASSISTANT

## 2025-07-15 RX ORDER — TRIAMCINOLONE ACETONIDE 40 MG/ML
40 INJECTION, SUSPENSION INTRA-ARTICULAR; INTRAMUSCULAR ONCE
Status: COMPLETED | OUTPATIENT
Start: 2025-07-15 | End: 2025-07-15

## 2025-07-15 RX ADMIN — TRIAMCINOLONE ACETONIDE 40 MG: 40 INJECTION, SUSPENSION INTRA-ARTICULAR; INTRAMUSCULAR at 09:56:00

## 2025-07-15 NOTE — PROCEDURES
Risks and benefits of knee injection discussed with the patient, with risks including but not limited to pain and swelling at the injection site and/or within the knee joint, infection, elevation in blood pressure and/or glucose levels, facial flushing. After informed consent, the patient's left knee was marked, locally anesthetized with skin refrigerant, prepped with topical antiseptic, and injected with a mixture of 1mL 40mg/mL Kenalog, 2mL 1% lidocaine and 2mL 0.5% marcaine through the inferolateral portal.  A band-aid was applied.  The patient tolerated the procedure well.    Guanaco Landeros PA-C  [facility] Orthopedic Surgery

## 2025-07-15 NOTE — PROGRESS NOTES
The following individual(s) verbally consented to be recorded using ambient AI listening technology and understand that they can each withdraw their consent to this listening technology at any point by asking the clinician to turn off or pause the recording:    Patient name: Patricia Martinez  Additional names:

## 2025-07-15 NOTE — PROGRESS NOTES
[facility] Progress Note    Subjective: Patient returns to clinic after last seeing me on 2/6/2024 for left knee pain.  She reports she had done really well after the meloxicam prescription with absence of knee pain up until about 1.5 months ago.  She noticed during a treadmill walk increased tightness and discomfort around the left knee.  She tried to stretch her knee out but ultimately this increased the pain.  From that point onwards, she has felt pain throughout the front of her left knee made worse with stair climbing, getting up from a prolonged seated position as well as walking.  She recently was on her long road trip and felt she had to constantly switch positions in the car due to increased anterior knee pain.  She tried taking meloxicam again without noticeable benefit.  Also has been icing, taking Tylenol and Advil without noticeable relief.  She has felt great reduction of her activity level due to the pain.  Does not recall any injury or noted any significant swelling.    Objective: Patient ambulating independently in clinic today.  Alert and oriented.  No acute distress.  Nonlabored breathing.  Gross neurologically intact.  Left knee without any significant effusion, redness or warmth.  Tender to palpation along the distal pole of the patella, bilateral patellar facets.  Patellofemoral crepitus felt throughout range of motion.  No signs of patellar instability.     Assessment/Plan: 44-year-old female with symptoms consistent with exacerbated patellofemoral syndrome of the left knee.  We discussed the biomechanics of the knee exacerbating stress on the lateral aspect of the patella, ultimately which creates inflammation along the articular surface of the patella.  Discussed methods to reduce inflammation and considered Medrol Dosepak, however the patient would like to avoid further oral steroids and is she  periodically needs to use oral steroids during the year to help combat her asthma.  We  then discussed intra-articular steroid injection into the knee which she expressed interest in receiving today.  This was performed, please see separate procedure note for additional details.  I also provided the patient with a knee conditioning packet with strengthening exercises for her hip adductors and stretching exercises for the abductors.  All questions were sought and answered satisfactorily.  She is happy with the plan will follow as advised       Guanaco Landeros PA-C  [facility]    This note was dictated using Dragon software.  While it was briefly proofread prior to completion, some grammatical, spelling, and word choice errors due to dictation may still occur.

## 2025-07-15 NOTE — PATIENT INSTRUCTIONS
Understanding Patellofemoral Syndrome      Patellofemoral syndrome is a condition that causes pain on the front of the knee. The large bones of the upper and lower leg meet at the knee. This joint also includes a small triangle-shaped bone that rests on top of the leg bones. This is the kneecap (patella). Patellofemoral refers to the patella and the thigh bone (femur). These bones are surrounded by connective tissue and muscles. Patellofemoral pain is believed to come from stress on the tissues of and around the knee.  What causes patellofemoral syndrome?  No single cause for patellofemoral pain has been found. But many things are likely to contribute to this type of knee pain. These include:  Actions that put repeated stress on the knee, such as running and squatting  Overtraining at a sport  Weak hip or thigh muscle  Normal variations in the way body parts fit together  Poor form during activities that stress the knee, such as running  A fall or blow to the knee    Symptoms of patellofemoral syndrome  Pain is a common symptom. It's often on the front of the knee, but can be around the kneecap. Pain can occur at certain times, such as when you are:  Running  Sitting for a long time with your knees bent, such as at a movie  Walking up or down stairs  Squatting  Other symptoms may include:  A feeling of the knee catching or locking  A grinding or crackling noise in your knee    Treatment for patellofemoral syndrome  Treatment focuses on reducing pain and avoiding further injury. Treatments may include:  Rest your leg. This gives your knee time to recover. You may need to avoid or change the activity that caused the problem, such as not running for a while.  Prescription or over-the-counter pain medicines. These help reduce inflammation, swelling, and pain.  Cold packs. These help reduce pain.  Stretches and other exercises. These can improve balance, flexibility, and strength.  A shoe insert (orthotic). This can make  your knee more stable.  Elastic tape or a brace. These can make your knee more stable.  Physical therapy. This may include exercises or other treatments.  Surgery. In rare cases, if other treatments don't relieve symptoms, you may need surgery.    Possible complications of patellofemoral syndrome  If you don't give your knee time to heal, symptoms may return or get worse. Follow your healthcare provider's instructions on resting and treating your knee.    Exercises to do for patellofemoral knee pain  Vastus Medialis Obliquus (VMO) strengthening  Quadriceps and Hamstring strengthening  Core (abdominal and low back) strengthening  Hip external rotator strengthening  Low-impact aerobic exercise (e.g. elliptical machine, biking, swimming RATHER THAN running, jogging)  Notes:  Limit knee flexion (e.g. squatting, lunging) to no more than 90 degrees  Focus on CLOSED (foot is on the ground or on a plate, e.g. squats, leg press, lunges) rather than OPEN (foot is hanging freely and not on the ground or on a plate, e.g. quadriceps knee extension/hamstring knee flexion machine exercises) chain strengthening exercises    Other Resources  American Academy of Orth[employer] OrthoInfo (information page on patellofemoral pain syndrome)  https://orthoinfo.aaos.org/en/diseases--conditions/patellofemoral-pain-syndrome/  American Academy of Orth[employer]edic [employer]geons Knee Conditioning Program (exercises to strengthen the knee)  https://orthoinfo.aaos.org/en/recovery/knee-conditioning-program/    Date Last Reviewed: 3/10/2016  © 4970-5942 [employer]. [address] [place] [address], [place] [address]. All rights reserved. This information is not intended as a substitute for professional medical care. Always follow your healthcare professional's instructions.

## 2025-08-10 ENCOUNTER — PATIENT MESSAGE (OUTPATIENT)
Facility: CLINIC | Age: 45
End: 2025-08-10

## (undated) DIAGNOSIS — F41.9 ANXIETY: ICD-10-CM

## (undated) NOTE — LETTER
09/13/18        [address]  [address] [id number]      Dear Shearon Blizzard,    [address] records indicate that you have outstanding lab work and or testing that was ordered for you and has not yet been completed:  Orders Placed This Encounte

## (undated) NOTE — ED AVS SNAPSHOT
Bailee Ennis   MRN: [id number]    Department:  [facility] Emergency Department   Date of Visit:  2/10/2020           Disclosure     Insurance plans vary and the physician(s) referred by the ER may not be covered by your plan.  Please contac tell this physician (or your personal doctor if your instructions are to return to your personal doctor) about any new or lasting problems. The primary care or specialist physician will see patients referred from the [facility] Emergency Department.  Severo Pastures

## (undated) NOTE — ED AVS SNAPSHOT
THE [facility] Emergency Department in [address] [facility]    Phone:  [phone]    Fax:  [phone]           Juan A Course   MRN: [id number]    Department:  THE [facility] Emergency Department in [place]   Date of V [address] [phone]  Pediatric 144 2185 Emergency Department   [phone]       To Check ER Wait Times:  TEXT 'ERwait' to 22791      Click [email or web address]      Or call [phone]    If you have any will be contacted. Please make sure we have your correct phone number before you leave. After you leave, you should follow the attached instructions. I have read and understand the instructions given to me by my caregivers.         [facility] Sign up for [employer]dexLinkt, your secure online medical record. [employer] will allow you to access patient instructions from your recent visit,  view other health information, and more. To sign up or find more information, go to [email or web address] and cl

## (undated) NOTE — LETTER
01/07/22        [place] [place] [id number]      Dear Jennifer Polanco,    [address] records indicate that you have outstanding lab work and or testing that was ordered for you and has not yet been completed:  Orders Placed This Enc

## (undated) NOTE — ED AVS SNAPSHOT
Katelyn Hannon   MRN: [id number]    Department:  [facility] Emergency Department   Date of Visit:  2/3/2018           Disclosure     Insurance plans vary and the physician(s) referred by the ER may not be covered by your plan.  Please contact tell this physician (or your personal doctor if your instructions are to return to your personal doctor) about any new or lasting problems. The primary care or specialist physician will see patients referred from the [facility] Emergency Department.  Elisa Butterfield

## (undated) NOTE — ED AVS SNAPSHOT
[facility] in [address] [place] [address]    Phone:  [phone]    Fax:  [phone]           Cecil Zafar   MRN: [id number]    [facility]  [facility] in [place]   Date of V IF THERE IS ANY CHANGE OR WORSENING OF YOUR CONDITION, CALL YOUR PRIMARY CARE PHYSICIAN AT ONCE OR RETURN IMMEDIATELY TO THE EMERGENCY DEPARTMENT.     If you have been prescribed any medication(s), please fill your prescription right away and begin taking t

## (undated) NOTE — LETTER
ASTHMA ACTION PLAN for Katelyn Hannon     : 1980     Date: 2020  Provider:  Betsy Daniel MD  Phone for doctor or clinic: [address] [facility], [address] [place] [address], / [facility] [address] [address]  [phone]

## (undated) NOTE — LETTER
ASTHMA ACTION PLAN for Lilly Cornejo     : 1980     Date: 2018  Provider:  Adore Burgos MD  Phone for doctor or clinic: [facility], [address] [facility], [facility]/ [address]  [phone]    A

## (undated) NOTE — LETTER
ASTHMA ACTION PLAN for Rae Flores     : 1980     Date: 2023  Provider:  Brian Pearson MD  Phone for doctor or clinic: [facility], hospitals, [address] [address]  [address] [address]  [phone]    ACT Score: 22      You can use the colors of a traffic light to help learn about your asthma medicines. 1. Green - Go! % of Personal Best Peak Flow Use controller medicine. Breathing is good  No cough or wheeze  Can work and play Medicine How much to take When to take it    Tony Pheasant 200-62.5-25 MCG/ACT Inhalation Aerosol Powder, Breath Activated  Inhale 1 puff into the lungs every morning        2. Yellow - Caution. 50-79% Personal Best Peak  Flow. Use reliever medicine to keep an asthma attack from getting bad. Cough  Wheezing  Tight Chest  Wake up at night Medicine How much to take When to take it    LEVALBUTEROL 0.63MG/3ML NEB 25X3ML  USE 1 VIAL VIA NEBULIZER 3 TO 4 TIMES DAILY AS NEEDED   Budesonide 0.5 MG/2ML Inhalation Suspension  Take 0.5 mg by nebulization 2 (two) times daily as needed  Albuterol (2.5 MG/3ML) 0.083% Inhalation Nebu Soln  Take 2.5 mg by nebulization 4 (four) times daily as needed  Albuterol Sulfate  (90 Base) MCG/ACT Inhalation Aero Soln  Inhale 2 puffs into the lungs 4 (four) times daily as needed       Additional instructions         3. Red - Stop! Danger!  <50% Personal Best Peak  Flow. Take these medications until  Get help from a doctor   Medicine not helping  Breathing is hard and fast  Nose opens wide  Can't walk  Ribs show  Can't talk well Medicine How much to take When to take it         Additional Instructions If your symptoms do not improve and you cannot contact your doctor, [facility] or call 911 immediately! [x] Asthma Action Plan reviewed with patient (and caregiver if necessary) and a copy of the plan was given to the patient/caregiver.    [] Asthma Action Plan reviewed with patient (and caregiver if necessary) on the phone and mailed copy to patient or submitted via [address]      Signatures:  Provider  Crystal Aaron MD   Patient Caretaker

## (undated) NOTE — LETTER
Date & Time: 12/18/2023, 9:56 AM  Patient: Nikos Soni  Encounter Provider(s):    GIDEON Flores       To Whom It May Concern:    Nikos Soni was seen and treated in our department on 12/18/2023. She should not return to work until symptoms improve . If you have any questions or concerns, please do not hesitate to call.         Jayce LUCAS

## (undated) NOTE — LETTER
Date: 1/22/2025    Patient Name: Patricia Martinez          To Whom it may concern:    This letter has been written at the patient's request. The above patient was seen at [facility] for treatment of a medical condition.    This patient should be excused from attending work through Friday Jan 24, 2025.    The patient may return to work on Sat Ashkan 25 2024 with the following limitations: none.        Sincerely,     Patricia Joe MD

## (undated) NOTE — LETTER
Patricia Martinez, :1980    CONSENT FOR PROCEDURE/SEDATION    1. I authorize the performance upon Patricia Martinez  the following: Endometrial biopsy procedure    2. I authorize Dr. Yang Hernandez MD (and whomever is designated as the doctor’s assistant), to perform the above-mentioned procedures.    3. If any unforeseen conditions arise during this procedure calling for additional  procedures, operations, or medications (including anesthesia and blood transfusion), I further request and authorize the doctor to do whatever he/she deems advisable in my interest.    4. I consent to the taking and reproduction of any photographs in the course of this procedure for professional purposes.    5. I consent to the administration of such sedation as may be considered necessary or advisable by the physician responsible for this service, with the exception of ______________________________________________________    6. I have been informed by my doctor of the nature and purpose of this procedure sedation, possible alternative methods of treatment, risk involved and possible complications.    7. If I have a Do Not Resuscitate (DNR) order in place, the physician and I (or the individual authorized to consent on my behalf) will discuss and agree as to whether the Do Not Resuscitate (DNR) order will remain in effect or will be discontinued during the performance of the procedure and the applicable recovery period. If the Do Not Resuscitate (DNR) order is discontinued and is to be reinstated following the procedure/recovery period, the physician will determine when the applicable recovery period ends for purposes of reinstating the Do Not Resuscitate (DNR) order.    Signature of Patient:_______________________________________________    Signature of person authorized to consent for patient:  _______________________________________________________________    Relationship to patient:  ____________________________________________    Witness: _________________________________________ Date:___________     Physician Signature: _______________________________ Date:___________

## (undated) NOTE — LETTER
ASTHMA ACTION PLAN for Chaya Mora     : 1980     Date: 2021  Provider:  Jeramy Roy MD  Phone for doctor or clinic: [facility], [address] [place] [address], [address], [address]  [phone]    A

## (undated) NOTE — MR AVS SNAPSHOT
After Visit Summary   9/24/2021    Kim Del Rosario   MRN: [id number]           Visit Information     Date & Time  9/24/2021 10:30 AM Provider  Denise Ty MD Department  [facility], [facility] [facility]  [facility]  Phone  [phone] CUSTOM]     THINPREP PAP SMEAR B [[id number] CUSTOM]     THINPREP PAP SMEAR ONLY [[id number] CUSTOM]     Future Labs/Procedures Expected by Expires    HPV HIGH RISK , THIN PREP COLLECTION [[id number] CUSTOM]  9/24/2021 9/24/2022    [facility] DOROTEO 2D+3D SCREENING BILAT (C

## (undated) NOTE — MR AVS SNAPSHOT
[facility] [address]  [address] [place]  [phone]               Thank you for choosing us for your health care visit with Sadneep Renteria MD.  We are glad to serve you and happy to provide you with [phone], Go to [facility] A ER Building (For example CT scans, X rays, Ultrasound, MRI)  •Cardiac Testing in ER building Building A second floor Cardiac Testing  (For example, Holter Monitor, Cardiac Stress tests, 2d Echocardiograms)  •Elvis Durand To best provide you care, patients receiving maintenance medications need to be seen at least twice a year.        Allergies as of Mar 28, 2017     No Known Allergies                Today's Vital Signs     BP Pulse Temp Height Weight BMI    122/80 mmHg 60 9 Eat plenty of protein, keep the fat content low Sugars:  sodas and sports drinks, candies and desserts   Eat plenty of low-fat dairy products High fat meats and dairy   Choose whole grain products Foods high in sodium   Water is best for hydration Fast luis